# Patient Record
Sex: FEMALE | Race: BLACK OR AFRICAN AMERICAN | NOT HISPANIC OR LATINO | Employment: OTHER | RURAL
[De-identification: names, ages, dates, MRNs, and addresses within clinical notes are randomized per-mention and may not be internally consistent; named-entity substitution may affect disease eponyms.]

---

## 2021-11-17 ENCOUNTER — OFFICE VISIT (OUTPATIENT)
Dept: FAMILY MEDICINE | Facility: CLINIC | Age: 86
End: 2021-11-17
Payer: MEDICARE

## 2021-11-17 VITALS
TEMPERATURE: 99 F | DIASTOLIC BLOOD PRESSURE: 58 MMHG | BODY MASS INDEX: 30 KG/M2 | WEIGHT: 163 LBS | OXYGEN SATURATION: 96 % | SYSTOLIC BLOOD PRESSURE: 90 MMHG | HEART RATE: 87 BPM | HEIGHT: 62 IN | RESPIRATION RATE: 18 BRPM

## 2021-11-17 DIAGNOSIS — N39.0 ACUTE LOWER URINARY TRACT INFECTION: Primary | ICD-10-CM

## 2021-11-17 DIAGNOSIS — R10.9 ABDOMINAL PAIN, UNSPECIFIED ABDOMINAL LOCATION: ICD-10-CM

## 2021-11-17 LAB
BACTERIA #/AREA URNS HPF: ABNORMAL /HPF
HYALINE CASTS #/AREA URNS LPF: ABNORMAL /LPF
MUCOUS THREADS #/AREA URNS HPF: ABNORMAL /HPF
RBC #/AREA URNS HPF: ABNORMAL /HPF
SQUAMOUS #/AREA URNS LPF: ABNORMAL /LPF
TRI-PHOS CRY #/AREA URNS LPF: ABNORMAL /LPF
WBC #/AREA URNS HPF: ABNORMAL /HPF

## 2021-11-17 PROCEDURE — 87186 SC STD MICRODIL/AGAR DIL: CPT | Mod: ,,, | Performed by: CLINICAL MEDICAL LABORATORY

## 2021-11-17 PROCEDURE — 81001 URINALYSIS, MICROSCOPIC: ICD-10-PCS | Mod: ,,, | Performed by: CLINICAL MEDICAL LABORATORY

## 2021-11-17 PROCEDURE — 1125F AMNT PAIN NOTED PAIN PRSNT: CPT | Mod: CPTII,,, | Performed by: FAMILY MEDICINE

## 2021-11-17 PROCEDURE — 1101F PT FALLS ASSESS-DOCD LE1/YR: CPT | Mod: CPTII,,, | Performed by: FAMILY MEDICINE

## 2021-11-17 PROCEDURE — 87077 CULTURE, URINE: ICD-10-PCS | Mod: ,,, | Performed by: CLINICAL MEDICAL LABORATORY

## 2021-11-17 PROCEDURE — 87077 CULTURE AEROBIC IDENTIFY: CPT | Mod: ,,, | Performed by: CLINICAL MEDICAL LABORATORY

## 2021-11-17 PROCEDURE — 87086 CULTURE, URINE: ICD-10-PCS | Mod: ,,, | Performed by: CLINICAL MEDICAL LABORATORY

## 2021-11-17 PROCEDURE — 99212 OFFICE O/P EST SF 10 MIN: CPT | Mod: ,,, | Performed by: FAMILY MEDICINE

## 2021-11-17 PROCEDURE — 87086 URINE CULTURE/COLONY COUNT: CPT | Mod: ,,, | Performed by: CLINICAL MEDICAL LABORATORY

## 2021-11-17 PROCEDURE — 3288F PR FALLS RISK ASSESSMENT DOCUMENTED: ICD-10-PCS | Mod: CPTII,,, | Performed by: FAMILY MEDICINE

## 2021-11-17 PROCEDURE — 87186 CULTURE, URINE: ICD-10-PCS | Mod: ,,, | Performed by: CLINICAL MEDICAL LABORATORY

## 2021-11-17 PROCEDURE — 99212 PR OFFICE/OUTPT VISIT, EST, LEVL II, 10-19 MIN: ICD-10-PCS | Mod: ,,, | Performed by: FAMILY MEDICINE

## 2021-11-17 PROCEDURE — 3288F FALL RISK ASSESSMENT DOCD: CPT | Mod: CPTII,,, | Performed by: FAMILY MEDICINE

## 2021-11-17 PROCEDURE — 1101F PR PT FALLS ASSESS DOC 0-1 FALLS W/OUT INJ PAST YR: ICD-10-PCS | Mod: CPTII,,, | Performed by: FAMILY MEDICINE

## 2021-11-17 PROCEDURE — 81001 URINALYSIS AUTO W/SCOPE: CPT | Mod: ,,, | Performed by: CLINICAL MEDICAL LABORATORY

## 2021-11-17 PROCEDURE — 1125F PR PAIN SEVERITY QUANTIFIED, PAIN PRESENT: ICD-10-PCS | Mod: CPTII,,, | Performed by: FAMILY MEDICINE

## 2021-11-17 RX ORDER — SULFAMETHOXAZOLE AND TRIMETHOPRIM 800; 160 MG/1; MG/1
1 TABLET ORAL 2 TIMES DAILY
Qty: 20 TABLET | Refills: 0 | Status: SHIPPED | OUTPATIENT
Start: 2021-11-17 | End: 2021-11-27

## 2021-11-19 LAB — UA COMPLETE W REFLEX CULTURE PNL UR: ABNORMAL

## 2021-12-27 RX ORDER — NITROFURANTOIN 25; 75 MG/1; MG/1
100 CAPSULE ORAL 2 TIMES DAILY
COMMUNITY
End: 2021-12-27 | Stop reason: SDUPTHER

## 2021-12-28 RX ORDER — NITROFURANTOIN 25; 75 MG/1; MG/1
100 CAPSULE ORAL 2 TIMES DAILY
Qty: 14 CAPSULE | Refills: 0 | Status: SHIPPED | OUTPATIENT
Start: 2021-12-28 | End: 2022-01-04

## 2022-04-06 ENCOUNTER — OFFICE VISIT (OUTPATIENT)
Dept: FAMILY MEDICINE | Facility: CLINIC | Age: 87
End: 2022-04-06
Payer: COMMERCIAL

## 2022-04-06 VITALS
RESPIRATION RATE: 18 BRPM | SYSTOLIC BLOOD PRESSURE: 120 MMHG | HEART RATE: 96 BPM | WEIGHT: 153 LBS | BODY MASS INDEX: 30.04 KG/M2 | DIASTOLIC BLOOD PRESSURE: 70 MMHG | HEIGHT: 60 IN | TEMPERATURE: 99 F | OXYGEN SATURATION: 98 %

## 2022-04-06 DIAGNOSIS — R35.0 URINATION FREQUENCY: ICD-10-CM

## 2022-04-06 DIAGNOSIS — N39.0 ACUTE URINARY TRACT INFECTION: Primary | ICD-10-CM

## 2022-04-06 LAB
BACTERIA #/AREA URNS HPF: ABNORMAL /HPF
BILIRUB SERPL-MCNC: NEGATIVE MG/DL
BLOOD URINE, POC: NORMAL
COLOR, POC UA: YELLOW
GLUCOSE UR QL STRIP: NEGATIVE
KETONES UR QL STRIP: NEGATIVE
LEUKOCYTE ESTERASE URINE, POC: NORMAL
MUCOUS THREADS #/AREA URNS HPF: ABNORMAL /HPF
NITRITE, POC UA: NEGATIVE
PH, POC UA: 7
PROTEIN, POC: 100
RBC #/AREA URNS HPF: ABNORMAL /HPF
SPECIFIC GRAVITY, POC UA: 1.02
TRI-PHOS CRY #/AREA URNS LPF: ABNORMAL /LPF
UROBILINOGEN, POC UA: 0.2
WBC #/AREA URNS HPF: ABNORMAL /HPF

## 2022-04-06 PROCEDURE — 1126F AMNT PAIN NOTED NONE PRSNT: CPT | Mod: ,,, | Performed by: FAMILY MEDICINE

## 2022-04-06 PROCEDURE — 1159F PR MEDICATION LIST DOCUMENTED IN MEDICAL RECORD: ICD-10-PCS | Mod: ,,, | Performed by: FAMILY MEDICINE

## 2022-04-06 PROCEDURE — 81003 URINALYSIS AUTO W/O SCOPE: CPT | Mod: RHCUB | Performed by: FAMILY MEDICINE

## 2022-04-06 PROCEDURE — 87077 CULTURE, URINE: ICD-10-PCS | Mod: ,,, | Performed by: CLINICAL MEDICAL LABORATORY

## 2022-04-06 PROCEDURE — 87186 SC STD MICRODIL/AGAR DIL: CPT | Mod: ,,, | Performed by: CLINICAL MEDICAL LABORATORY

## 2022-04-06 PROCEDURE — 99212 PR OFFICE/OUTPT VISIT, EST, LEVL II, 10-19 MIN: ICD-10-PCS | Mod: ,,, | Performed by: FAMILY MEDICINE

## 2022-04-06 PROCEDURE — 87086 CULTURE, URINE: ICD-10-PCS | Mod: ,,, | Performed by: CLINICAL MEDICAL LABORATORY

## 2022-04-06 PROCEDURE — 87077 CULTURE AEROBIC IDENTIFY: CPT | Mod: ,,, | Performed by: CLINICAL MEDICAL LABORATORY

## 2022-04-06 PROCEDURE — 1159F MED LIST DOCD IN RCRD: CPT | Mod: ,,, | Performed by: FAMILY MEDICINE

## 2022-04-06 PROCEDURE — 81003 URINALYSIS AUTO W/O SCOPE: CPT | Mod: QW,,, | Performed by: FAMILY MEDICINE

## 2022-04-06 PROCEDURE — 81001 URINALYSIS, MICROSCOPIC: ICD-10-PCS | Mod: ,,, | Performed by: CLINICAL MEDICAL LABORATORY

## 2022-04-06 PROCEDURE — 1126F PR PAIN SEVERITY QUANTIFIED, NO PAIN PRESENT: ICD-10-PCS | Mod: ,,, | Performed by: FAMILY MEDICINE

## 2022-04-06 PROCEDURE — 1160F RVW MEDS BY RX/DR IN RCRD: CPT | Mod: ,,, | Performed by: FAMILY MEDICINE

## 2022-04-06 PROCEDURE — 81003 PR URINALYSIS, AUTO, W/O SCOPE: ICD-10-PCS | Mod: QW,,, | Performed by: FAMILY MEDICINE

## 2022-04-06 PROCEDURE — 81001 URINALYSIS AUTO W/SCOPE: CPT | Mod: ,,, | Performed by: CLINICAL MEDICAL LABORATORY

## 2022-04-06 PROCEDURE — 87086 URINE CULTURE/COLONY COUNT: CPT | Mod: ,,, | Performed by: CLINICAL MEDICAL LABORATORY

## 2022-04-06 PROCEDURE — 87186 CULTURE, URINE: ICD-10-PCS | Mod: ,,, | Performed by: CLINICAL MEDICAL LABORATORY

## 2022-04-06 PROCEDURE — 1160F PR REVIEW ALL MEDS BY PRESCRIBER/CLIN PHARMACIST DOCUMENTED: ICD-10-PCS | Mod: ,,, | Performed by: FAMILY MEDICINE

## 2022-04-06 PROCEDURE — 99212 OFFICE O/P EST SF 10 MIN: CPT | Mod: ,,, | Performed by: FAMILY MEDICINE

## 2022-04-06 RX ORDER — FLUTICASONE PROPIONATE AND SALMETEROL 50; 250 UG/1; UG/1
1 POWDER RESPIRATORY (INHALATION) 2 TIMES DAILY
COMMUNITY
Start: 2022-02-07

## 2022-04-06 RX ORDER — FLUTICASONE PROPIONATE 50 MCG
SPRAY, SUSPENSION (ML) NASAL
COMMUNITY
Start: 2022-02-07

## 2022-04-06 RX ORDER — POTASSIUM CHLORIDE 750 MG/1
10 TABLET, EXTENDED RELEASE ORAL 2 TIMES DAILY
COMMUNITY
Start: 2022-03-24 | End: 2022-07-20 | Stop reason: SDUPTHER

## 2022-04-06 RX ORDER — CETIRIZINE HYDROCHLORIDE 10 MG/1
10 TABLET ORAL DAILY
COMMUNITY
End: 2022-07-20 | Stop reason: SDUPTHER

## 2022-04-06 RX ORDER — OXYBUTYNIN CHLORIDE 5 MG/1
5 TABLET ORAL 2 TIMES DAILY
COMMUNITY

## 2022-04-06 RX ORDER — DICLOFENAC SODIUM 10 MG/G
GEL TOPICAL
COMMUNITY
Start: 2022-02-07

## 2022-04-06 RX ORDER — METOPROLOL SUCCINATE 25 MG/1
25 TABLET, EXTENDED RELEASE ORAL DAILY
COMMUNITY
End: 2022-07-20 | Stop reason: SDUPTHER

## 2022-04-06 RX ORDER — POTASSIUM CHLORIDE 750 MG/1
10 TABLET, EXTENDED RELEASE ORAL 2 TIMES DAILY
COMMUNITY
End: 2022-04-06 | Stop reason: SDUPTHER

## 2022-04-06 RX ORDER — DOCUSATE SODIUM 100 MG/1
100 CAPSULE, LIQUID FILLED ORAL 2 TIMES DAILY PRN
COMMUNITY

## 2022-04-06 RX ORDER — LOVASTATIN 20 MG/1
20 TABLET ORAL NIGHTLY
COMMUNITY
End: 2022-07-20 | Stop reason: SDUPTHER

## 2022-04-06 RX ORDER — FUROSEMIDE 20 MG/1
20 TABLET ORAL 2 TIMES DAILY
COMMUNITY
Start: 2022-02-07

## 2022-04-06 RX ORDER — NITROFURANTOIN 25; 75 MG/1; MG/1
100 CAPSULE ORAL 2 TIMES DAILY
Qty: 10 CAPSULE | Refills: 0 | Status: SHIPPED | OUTPATIENT
Start: 2022-04-06 | End: 2022-04-11

## 2022-04-06 NOTE — PROGRESS NOTES
Clinic Note    Patient Name: Aviva Gonzalez  : 1935  MRN: [id number]    HPI:    Chief Complaint   Patient presents with    Urinary Frequency     Two weeks       Ms. Aviva Gonzalez is a 86 y.o. female who present to clinic today with CC of urinary frequency X 2 weeks.  Patient reports associated dysuria and occasional abdominal pain. Denies back pain, fever, or nausea/vomiting.  Otherwise, without complaints.    Medications:  Current Outpatient Medications on File Prior to Visit   Medication Sig Dispense Refill    cetirizine (ZYRTEC) 10 MG tablet Take 10 mg by mouth once daily.      docusate sodium (COLACE) 100 MG capsule Take 100 mg by mouth 2 (two) times daily as needed for Constipation.      lovastatin (MEVACOR) 20 MG tablet Take 20 mg by mouth every evening.      metoprolol succinate (TOPROL-XL) 25 MG 24 hr tablet Take 25 mg by mouth once daily.      oxybutynin (DITROPAN) 5 MG Tab Take 5 mg by mouth 2 (two) times a day.      [DISCONTINUED] potassium chloride (KLOR-CON) 10 MEQ TbSR Take 10 mEq by mouth 2 (two) times a day.      ADVAIR DISKUS 250-50 mcg/dose diskus inhaler Inhale 1 puff into the lungs 2 (two) times daily.      diclofenac sodium (VOLTAREN) 1 % Gel Apply topically.      fluticasone propionate (FLONASE) 50 mcg/actuation nasal spray by Each Nostril route.      furosemide (LASIX) 20 MG tablet Take 20 mg by mouth once daily.      potassium chloride SA (K-DUR,KLOR-CON) 10 MEQ tablet Take 10 mEq by mouth 2 (two) times daily.       No current facility-administered medications on file prior to visit.         Allergies: Penicillins      Past Medical History:    History reviewed. No pertinent past medical history.    Past Surgical History:    History reviewed. No pertinent surgical history.      Social History:    Social History     Tobacco Use   Smoking Status Never Smoker   Smokeless Tobacco Current User    Types: Chew     Social History     Substance and Sexual Activity   Alcohol Use Not  Currently     Social History     Substance and Sexual Activity   Drug Use Never         Family History:    History reviewed. No pertinent family history.    Review of Systems:    Review of Systems   Constitutional: Negative for appetite change, chills, fatigue, fever and unexpected weight change.   Eyes: Negative for visual disturbance.   Respiratory: Negative for cough and shortness of breath.    Cardiovascular: Negative for chest pain and leg swelling.   Gastrointestinal: Negative for abdominal pain, change in bowel habit, constipation, diarrhea, nausea, vomiting and change in bowel habit.   Genitourinary: Positive for frequency.   Musculoskeletal: Positive for arthralgias.   Integumentary:  Negative for rash.   Neurological: Negative for dizziness.        Reports occasional headaches   Psychiatric/Behavioral: The patient is not nervous/anxious.         Vitals:    /70 (BP Location: Right arm, Patient Position: Sitting, BP Method: Medium (Manual))   Pulse 96   Temp 98.5 °F (36.9 °C) (Oral)   Resp 18   Ht 5' (1.524 m)   Wt 69.4 kg (153 lb)   SpO2 98%   BMI 29.88 kg/m²        Physical Exam:    Physical Exam  Constitutional:       General: She is not in acute distress.     Appearance: Normal appearance.   HENT:      Nose: Nose normal.      Mouth/Throat:      Mouth: Mucous membranes are moist.      Pharynx: Oropharynx is clear.   Eyes:      Conjunctiva/sclera: Conjunctivae normal.   Cardiovascular:      Rate and Rhythm: Normal rate and regular rhythm.      Heart sounds: Normal heart sounds. No murmur heard.  Pulmonary:      Effort: Pulmonary effort is normal. No respiratory distress.      Breath sounds: Normal breath sounds. No wheezing, rhonchi or rales.   Abdominal:      General: Bowel sounds are normal.      Palpations: Abdomen is soft.      Tenderness: There is no abdominal tenderness. There is no right CVA tenderness, left CVA tenderness, guarding or rebound.   Musculoskeletal:      Cervical back:  Neck supple.   Skin:     Findings: No rash.   Neurological:      General: No focal deficit present.      Mental Status: She is alert. Mental status is at baseline.   Psychiatric:         Mood and Affect: Mood normal.         Assessment/Plan:   Acute urinary tract infection  -     nitrofurantoin, macrocrystal-monohydrate, (MACROBID) 100 MG capsule; Take 1 capsule (100 mg total) by mouth 2 (two) times daily. for 5 days  Dispense: 10 capsule; Refill: 0  -     Urinalysis, Microscopic; Future; Expected date: 04/06/2022  -     Urine culture; Future; Expected date: 04/06/2022    Urination frequency  -     POCT URINALYSIS W/O SCOPE  -     Urinalysis, Microscopic; Future; Expected date: 04/06/2022  -     Urine culture; Future; Expected date: 04/06/2022    RTC prn if symptoms worsen or fail to resolve.  RTC sooner if needed.   Patient voiced understanding and is agreeable to plan.      Gregoria Winter MD    Family Medicine

## 2022-04-08 LAB — UA COMPLETE W REFLEX CULTURE PNL UR: ABNORMAL

## 2022-04-21 NOTE — PROGRESS NOTES
ATTEMPT TO REACH SEVERAL TIMES, UNABLE TO REACH THIS PATIENT A LETTER WILL BE SENT TO ADDRESS IN [place]

## 2022-07-20 ENCOUNTER — OFFICE VISIT (OUTPATIENT)
Dept: FAMILY MEDICINE | Facility: CLINIC | Age: 87
End: 2022-07-20
Payer: COMMERCIAL

## 2022-07-20 VITALS
BODY MASS INDEX: 30.82 KG/M2 | SYSTOLIC BLOOD PRESSURE: 118 MMHG | OXYGEN SATURATION: 97 % | DIASTOLIC BLOOD PRESSURE: 76 MMHG | HEIGHT: 60 IN | RESPIRATION RATE: 20 BRPM | HEART RATE: 70 BPM | WEIGHT: 157 LBS | TEMPERATURE: 98 F

## 2022-07-20 DIAGNOSIS — R31.9 URINARY TRACT INFECTION WITH HEMATURIA, SITE UNSPECIFIED: Primary | ICD-10-CM

## 2022-07-20 DIAGNOSIS — N39.0 URINARY TRACT INFECTION WITH HEMATURIA, SITE UNSPECIFIED: Primary | ICD-10-CM

## 2022-07-20 DIAGNOSIS — J30.2 SEASONAL ALLERGIES: ICD-10-CM

## 2022-07-20 DIAGNOSIS — R35.0 URINARY FREQUENCY: ICD-10-CM

## 2022-07-20 DIAGNOSIS — E78.5 HYPERLIPIDEMIA, UNSPECIFIED HYPERLIPIDEMIA TYPE: ICD-10-CM

## 2022-07-20 DIAGNOSIS — E87.6 HYPOKALEMIA: ICD-10-CM

## 2022-07-20 DIAGNOSIS — R30.9 PAIN WITH URINATION: ICD-10-CM

## 2022-07-20 DIAGNOSIS — I10 HYPERTENSION, UNSPECIFIED TYPE: ICD-10-CM

## 2022-07-20 DIAGNOSIS — E55.9 VITAMIN D DEFICIENCY: ICD-10-CM

## 2022-07-20 LAB
BACTERIA #/AREA URNS HPF: ABNORMAL /HPF
BILIRUB UR QL STRIP: NEGATIVE
CLARITY UR: ABNORMAL
COLOR UR: YELLOW
GLUCOSE UR STRIP-MCNC: NORMAL MG/DL
KETONES UR STRIP-SCNC: NEGATIVE MG/DL
LEUKOCYTE ESTERASE UR QL STRIP: ABNORMAL
MUCOUS, UA: ABNORMAL /LPF
NITRITE UR QL STRIP: NEGATIVE
PH UR STRIP: 6.5 PH UNITS
PROT UR QL STRIP: 30
RBC # UR STRIP: ABNORMAL /UL
RBC #/AREA URNS HPF: 33 /HPF
SP GR UR STRIP: 1.01
SQUAMOUS #/AREA URNS LPF: ABNORMAL /HPF
UROBILINOGEN UR STRIP-ACNC: NORMAL MG/DL
WBC #/AREA URNS HPF: >182 /HPF
WBC CLUMPS, UA: ABNORMAL /HPF

## 2022-07-20 PROCEDURE — 1126F AMNT PAIN NOTED NONE PRSNT: CPT | Mod: ,,, | Performed by: NURSE PRACTITIONER

## 2022-07-20 PROCEDURE — 81001 URINALYSIS, REFLEX TO URINE CULTURE: ICD-10-PCS | Mod: ,,, | Performed by: CLINICAL MEDICAL LABORATORY

## 2022-07-20 PROCEDURE — 1160F RVW MEDS BY RX/DR IN RCRD: CPT | Mod: ,,, | Performed by: NURSE PRACTITIONER

## 2022-07-20 PROCEDURE — 81001 URINALYSIS AUTO W/SCOPE: CPT | Mod: ,,, | Performed by: CLINICAL MEDICAL LABORATORY

## 2022-07-20 PROCEDURE — 1160F PR REVIEW ALL MEDS BY PRESCRIBER/CLIN PHARMACIST DOCUMENTED: ICD-10-PCS | Mod: ,,, | Performed by: NURSE PRACTITIONER

## 2022-07-20 PROCEDURE — 81003 URINALYSIS AUTO W/O SCOPE: CPT | Mod: QW,,, | Performed by: NURSE PRACTITIONER

## 2022-07-20 PROCEDURE — 3288F FALL RISK ASSESSMENT DOCD: CPT | Mod: ,,, | Performed by: NURSE PRACTITIONER

## 2022-07-20 PROCEDURE — 87086 URINE CULTURE/COLONY COUNT: CPT | Mod: ,,, | Performed by: CLINICAL MEDICAL LABORATORY

## 2022-07-20 PROCEDURE — 1126F PR PAIN SEVERITY QUANTIFIED, NO PAIN PRESENT: ICD-10-PCS | Mod: ,,, | Performed by: NURSE PRACTITIONER

## 2022-07-20 PROCEDURE — 81003 POCT URINALYSIS W/O SCOPE: ICD-10-PCS | Mod: QW,,, | Performed by: NURSE PRACTITIONER

## 2022-07-20 PROCEDURE — 87086 CULTURE, URINE: ICD-10-PCS | Mod: ,,, | Performed by: CLINICAL MEDICAL LABORATORY

## 2022-07-20 PROCEDURE — 3288F PR FALLS RISK ASSESSMENT DOCUMENTED: ICD-10-PCS | Mod: ,,, | Performed by: NURSE PRACTITIONER

## 2022-07-20 PROCEDURE — 1159F PR MEDICATION LIST DOCUMENTED IN MEDICAL RECORD: ICD-10-PCS | Mod: ,,, | Performed by: NURSE PRACTITIONER

## 2022-07-20 PROCEDURE — 87186 SC STD MICRODIL/AGAR DIL: CPT | Mod: ,,, | Performed by: CLINICAL MEDICAL LABORATORY

## 2022-07-20 PROCEDURE — 87077 CULTURE AEROBIC IDENTIFY: CPT | Mod: ,,, | Performed by: CLINICAL MEDICAL LABORATORY

## 2022-07-20 PROCEDURE — 1159F MED LIST DOCD IN RCRD: CPT | Mod: ,,, | Performed by: NURSE PRACTITIONER

## 2022-07-20 PROCEDURE — 99213 PR OFFICE/OUTPT VISIT, EST, LEVL III, 20-29 MIN: ICD-10-PCS | Mod: ,,, | Performed by: NURSE PRACTITIONER

## 2022-07-20 PROCEDURE — 87077 CULTURE, URINE: ICD-10-PCS | Mod: ,,, | Performed by: CLINICAL MEDICAL LABORATORY

## 2022-07-20 PROCEDURE — 99213 OFFICE O/P EST LOW 20 MIN: CPT | Mod: ,,, | Performed by: NURSE PRACTITIONER

## 2022-07-20 PROCEDURE — 1101F PT FALLS ASSESS-DOCD LE1/YR: CPT | Mod: ,,, | Performed by: NURSE PRACTITIONER

## 2022-07-20 PROCEDURE — 1101F PR PT FALLS ASSESS DOC 0-1 FALLS W/OUT INJ PAST YR: ICD-10-PCS | Mod: ,,, | Performed by: NURSE PRACTITIONER

## 2022-07-20 PROCEDURE — 87186 CULTURE, URINE: ICD-10-PCS | Mod: ,,, | Performed by: CLINICAL MEDICAL LABORATORY

## 2022-07-20 RX ORDER — CYANOCOBALAMIN (VITAMIN B-12) 500 MCG
400 TABLET ORAL DAILY
Qty: 30 TABLET | Refills: 0 | Status: SHIPPED | OUTPATIENT
Start: 2022-07-20

## 2022-07-20 RX ORDER — SULFAMETHOXAZOLE AND TRIMETHOPRIM 800; 160 MG/1; MG/1
1 TABLET ORAL 2 TIMES DAILY
Qty: 20 TABLET | Refills: 0 | Status: SHIPPED | OUTPATIENT
Start: 2022-07-20 | End: 2023-04-11

## 2022-07-20 RX ORDER — LOVASTATIN 20 MG/1
20 TABLET ORAL NIGHTLY
Qty: 30 TABLET | Refills: 0 | Status: SHIPPED | OUTPATIENT
Start: 2022-07-20

## 2022-07-20 RX ORDER — METOPROLOL SUCCINATE 25 MG/1
25 TABLET, EXTENDED RELEASE ORAL DAILY
Qty: 30 TABLET | Refills: 0 | Status: SHIPPED | OUTPATIENT
Start: 2022-07-20

## 2022-07-20 RX ORDER — CYANOCOBALAMIN (VITAMIN B-12) 500 MCG
TABLET ORAL DAILY
COMMUNITY
End: 2022-07-20 | Stop reason: SDUPTHER

## 2022-07-20 RX ORDER — CHOLECALCIFEROL (VITAMIN D3) 25 MCG
1000 TABLET ORAL DAILY
COMMUNITY
End: 2024-02-05 | Stop reason: SDUPTHER

## 2022-07-20 RX ORDER — CETIRIZINE HYDROCHLORIDE 10 MG/1
10 TABLET ORAL DAILY
Qty: 30 TABLET | Refills: 0 | Status: SHIPPED | OUTPATIENT
Start: 2022-07-20

## 2022-07-20 RX ORDER — POTASSIUM CHLORIDE 750 MG/1
10 TABLET, EXTENDED RELEASE ORAL 2 TIMES DAILY
Qty: 60 TABLET | Refills: 0 | Status: SHIPPED | OUTPATIENT
Start: 2022-07-20

## 2022-07-20 NOTE — PROGRESS NOTES
New clinic note    Aviva Gonzalez is a 86 y.o. female     Chief Complaint:   Chief Complaint   Patient presents with    Urinary Frequency    Dysuria    Medication Refill        Subjective:    Patient complains of dysuria and urinary frequency. Patient reports symptoms X 1 week. Denies hematuria. Denies fever. Denies hx of kidney stones.   Patient states she sees her pcp at [facility] next week. Patient states she is going to run completely out of a few of medications prior to visit. Patient requesting temporary refills if possible. Denies any adverse side effects.        Allergies:   Review of patient's allergies indicates:   Allergen Reactions    Penicillins Rash        Past Medical History:  History reviewed. No pertinent past medical history.     Current Medications:    Current Outpatient Medications:     ADVAIR DISKUS 250-50 mcg/dose diskus inhaler, Inhale 1 puff into the lungs 2 (two) times daily., Disp: , Rfl:     diclofenac sodium (VOLTAREN) 1 % Gel, Apply topically., Disp: , Rfl:     docusate sodium (COLACE) 100 MG capsule, Take 100 mg by mouth 2 (two) times daily as needed for Constipation., Disp: , Rfl:     fluticasone propionate (FLONASE) 50 mcg/actuation nasal spray, by Each Nostril route., Disp: , Rfl:     furosemide (LASIX) 20 MG tablet, Take 20 mg by mouth once daily., Disp: , Rfl:     oxybutynin (DITROPAN) 5 MG Tab, Take 5 mg by mouth 2 (two) times a day., Disp: , Rfl:     vitamin D (VITAMIN D3) 1000 units Tab, Take 1,000 Units by mouth once daily., Disp: , Rfl:     cetirizine (ZYRTEC) 10 MG tablet, Take 1 tablet (10 mg total) by mouth once daily., Disp: 30 tablet, Rfl: 0    cholecalciferol, vitamin D3, (VITAMIN D3) 10 mcg (400 unit) Tab, Take 1 tablet (400 Units total) by mouth once daily., Disp: 30 tablet, Rfl: 0    lovastatin (MEVACOR) 20 MG tablet, Take 1 tablet (20 mg total) by mouth every evening., Disp: 30 tablet, Rfl: 0    metoprolol succinate (TOPROL-XL) 25 MG 24 hr tablet,  Take 1 tablet (25 mg total) by mouth once daily., Disp: 30 tablet, Rfl: 0    potassium chloride SA (K-DUR,KLOR-CON) 10 MEQ tablet, Take 1 tablet (10 mEq total) by mouth 2 (two) times daily., Disp: 60 tablet, Rfl: 0    sulfamethoxazole-trimethoprim 800-160mg (BACTRIM DS) 800-160 mg Tab, Take 1 tablet by mouth 2 (two) times daily., Disp: 20 tablet, Rfl: 0       Review of Systems   Constitutional: Negative for fever.   Respiratory: Negative for cough.    Genitourinary: Positive for dysuria and frequency. Negative for hematuria.          Objective:    /76 (BP Location: Left arm, Patient Position: Sitting, BP Method: Large (Manual))   Pulse 70   Temp 97.6 °F (36.4 °C) (Temporal)   Resp 20   Ht 5' (1.524 m)   Wt 71.2 kg (157 lb)   SpO2 97%   BMI 30.66 kg/m²      Physical Exam  Constitutional:       Appearance: Normal appearance.   Eyes:      Extraocular Movements: Extraocular movements intact.   Cardiovascular:      Rate and Rhythm: Normal rate and regular rhythm.      Pulses: Normal pulses.      Heart sounds: Normal heart sounds.   Pulmonary:      Effort: Pulmonary effort is normal.      Breath sounds: Normal breath sounds.   Abdominal:      Palpations: Abdomen is soft.      Tenderness: There is no abdominal tenderness. There is no right CVA tenderness, left CVA tenderness or guarding.   Musculoskeletal:      Comments: Uses rolling walker   Neurological:      Mental Status: She is alert and oriented to person, place, and time.          Assessment and Plan:    1. Urinary tract infection with hematuria, site unspecified    2. Urinary frequency    3. Pain with urination    4. Hyperlipidemia, unspecified hyperlipidemia type    5. Vitamin D deficiency    6. Hypokalemia    7. Hypertension, unspecified type    8. Seasonal allergies         Urinary tract infection with hematuria, site unspecified  -     sulfamethoxazole-trimethoprim 800-160mg (BACTRIM DS) 800-160 mg Tab; Take 1 tablet by mouth 2 (two) times daily.   Dispense: 20 tablet; Refill: 0  -     Urinalysis, Reflex to Urine Culture; Future; Expected date: 07/20/2022    Urinary frequency  -     POCT URINALYSIS W/O SCOPE    Pain with urination  -     POCT URINALYSIS W/O SCOPE    Hyperlipidemia, unspecified hyperlipidemia type  -     lovastatin (MEVACOR) 20 MG tablet; Take 1 tablet (20 mg total) by mouth every evening.  Dispense: 30 tablet; Refill: 0    Vitamin D deficiency  -     cholecalciferol, vitamin D3, (VITAMIN D3) 10 mcg (400 unit) Tab; Take 1 tablet (400 Units total) by mouth once daily.  Dispense: 30 tablet; Refill: 0    Hypokalemia  -     potassium chloride SA (K-DUR,KLOR-CON) 10 MEQ tablet; Take 1 tablet (10 mEq total) by mouth 2 (two) times daily.  Dispense: 60 tablet; Refill: 0    Hypertension, unspecified type  -     metoprolol succinate (TOPROL-XL) 25 MG 24 hr tablet; Take 1 tablet (25 mg total) by mouth once daily.  Dispense: 30 tablet; Refill: 0    Seasonal allergies  -     cetirizine (ZYRTEC) 10 MG tablet; Take 1 tablet (10 mg total) by mouth once daily.  Dispense: 30 tablet; Refill: 0       --culture urine.  -rx bactrim ds bid  -refilled routine meds patient requesting temporary supply until f/u with pcp.     There are no Patient Instructions on file for this visit.   Follow up if symptoms worsen or fail to improve.

## 2022-07-21 LAB
BILIRUB SERPL-MCNC: ABNORMAL MG/DL
BLOOD URINE, POC: ABNORMAL
COLOR, POC UA: YELLOW
GLUCOSE UR QL STRIP: ABNORMAL
KETONES UR QL STRIP: ABNORMAL
LEUKOCYTE ESTERASE URINE, POC: ABNORMAL
NITRITE, POC UA: ABNORMAL
PH, POC UA: 6
PROTEIN, POC: 100
SPECIFIC GRAVITY, POC UA: 1.02
UROBILINOGEN, POC UA: 0.2

## 2022-07-22 LAB — UA COMPLETE W REFLEX CULTURE PNL UR: ABNORMAL

## 2022-11-09 DIAGNOSIS — Z71.89 COMPLEX CARE COORDINATION: ICD-10-CM

## 2023-04-08 ENCOUNTER — HOSPITAL ENCOUNTER (EMERGENCY)
Facility: HOSPITAL | Age: 88
Discharge: HOME | End: 2023-04-08
Payer: MEDICAID

## 2023-04-08 VITALS
DIASTOLIC BLOOD PRESSURE: 67 MMHG | WEIGHT: 160 LBS | HEIGHT: 64 IN | BODY MASS INDEX: 27.31 KG/M2 | TEMPERATURE: 99 F | OXYGEN SATURATION: 98 % | SYSTOLIC BLOOD PRESSURE: 127 MMHG | RESPIRATION RATE: 18 BRPM | HEART RATE: 83 BPM

## 2023-04-08 DIAGNOSIS — J06.9 RECENT UPPER RESPIRATORY TRACT INFECTION: ICD-10-CM

## 2023-04-08 DIAGNOSIS — K22.2 ESOPHAGEAL STRICTURE: Primary | ICD-10-CM

## 2023-04-08 DIAGNOSIS — M19.90 ARTHRITIS: ICD-10-CM

## 2023-04-08 PROCEDURE — 96372 THER/PROPH/DIAG INJ SC/IM: CPT | Performed by: FAMILY MEDICINE

## 2023-04-08 PROCEDURE — 25000003 PHARM REV CODE 250: Performed by: FAMILY MEDICINE

## 2023-04-08 PROCEDURE — 99284 EMERGENCY DEPT VISIT MOD MDM: CPT

## 2023-04-08 PROCEDURE — 99283 PR EMERGENCY DEPT VISIT,LEVEL III: ICD-10-PCS | Mod: EDII,,, | Performed by: FAMILY MEDICINE

## 2023-04-08 PROCEDURE — 99283 EMERGENCY DEPT VISIT LOW MDM: CPT | Mod: EDII,,, | Performed by: FAMILY MEDICINE

## 2023-04-08 PROCEDURE — 99283 EMERGENCY DEPT VISIT LOW MDM: CPT

## 2023-04-08 PROCEDURE — 63600175 PHARM REV CODE 636 W HCPCS: Performed by: FAMILY MEDICINE

## 2023-04-08 RX ORDER — DEXAMETHASONE SODIUM PHOSPHATE 4 MG/ML
4 INJECTION, SOLUTION INTRA-ARTICULAR; INTRALESIONAL; INTRAMUSCULAR; INTRAVENOUS; SOFT TISSUE
Status: COMPLETED | OUTPATIENT
Start: 2023-04-08 | End: 2023-04-08

## 2023-04-08 RX ORDER — METHYLPREDNISOLONE ACETATE 40 MG/ML
40 INJECTION, SUSPENSION INTRA-ARTICULAR; INTRALESIONAL; INTRAMUSCULAR; SOFT TISSUE
Status: COMPLETED | OUTPATIENT
Start: 2023-04-08 | End: 2023-04-08

## 2023-04-08 RX ADMIN — LIDOCAINE HYDROCHLORIDE 1 G: 10 INJECTION, SOLUTION INFILTRATION; PERINEURAL at 07:04

## 2023-04-08 RX ADMIN — DEXAMETHASONE SODIUM PHOSPHATE 4 MG: 4 INJECTION, SOLUTION INTRA-ARTICULAR; INTRALESIONAL; INTRAMUSCULAR; INTRAVENOUS; SOFT TISSUE at 07:04

## 2023-04-08 RX ADMIN — METHYLPREDNISOLONE ACETATE 40 MG: 40 INJECTION, SUSPENSION INTRA-ARTICULAR; INTRALESIONAL; INTRAMUSCULAR; SOFT TISSUE at 07:04

## 2023-04-08 NOTE — ED PROVIDER NOTES
Encounter Date: 4/8/2023       History     Chief Complaint   Patient presents with    Sore Throat     Patient comes in with progressive weakness lower extremity.  Difficulty walking.  He has a long-term history of arthritis.  There is no other complications except for nasal congestion upper respiratory tract symptoms.  Explained the patient she may be able to have home health care through [facility] to do physical therapy.  She also has problems swallowing food she is a history of esophageal stricture.  And having balloon dilatation in the past.  Through [facility]      Review of patient's allergies indicates:   Allergen Reactions    Penicillins Rash     Past Medical History:   Diagnosis Date    Constipation     Hypercholesteremia     Hypertension     Hypokalemia     Renal disorder     Rheumatoid arthritis, unspecified     Seasonal allergies     Urinary frequency     Vitamin D deficiency      History reviewed. No pertinent surgical history.  History reviewed. No pertinent family history.  Social History     Tobacco Use    Smoking status: Never    Smokeless tobacco: Current     Types: Chew   Substance Use Topics    Alcohol use: Not Currently    Drug use: Never     Review of Systems   Constitutional: Negative.  Negative for fever.   HENT: Negative.  Negative for sore throat.         Patient with difficulty swallowing able to tolerate liquids by the patient to do Ensure over-the-counter.  And will make follow-up arrangements for the patient have esophageal stricture balloon with an EGD and a GI special   Eyes: Negative.    Respiratory: Negative.  Negative for shortness of breath.    Cardiovascular: Negative.  Negative for chest pain.   Gastrointestinal: Negative.  Negative for nausea.   Endocrine: Negative.    Genitourinary: Negative.  Negative for dysuria.   Musculoskeletal: Negative.  Negative for back pain.   Skin: Negative.  Negative for rash.   Allergic/Immunologic: Negative.     Neurological: Negative.  Negative for weakness.   Hematological: Negative.  Does not bruise/bleed easily.   Psychiatric/Behavioral: Negative.     All other systems reviewed and are negative.    Physical Exam     Initial Vitals [04/08/23 1726]   BP Pulse Resp Temp SpO2   127/67 83 18 98.5 °F (36.9 °C) 98 %      MAP       --         Physical Exam    Constitutional: She appears well-developed and well-nourished.   HENT:   Head: Normocephalic and atraumatic.   Right Ear: External ear normal.   Left Ear: External ear normal.   Nose: Nose normal.   Mouth/Throat: Oropharynx is clear and moist.   Eyes: Conjunctivae and EOM are normal. Pupils are equal, round, and reactive to light.   Neck: Neck supple.   Normal range of motion.  Cardiovascular:  Normal rate, regular rhythm, normal heart sounds and intact distal pulses.           Pulmonary/Chest: Breath sounds normal.   Abdominal: Abdomen is soft. Bowel sounds are normal.   Genitourinary:    Vagina and uterus normal.     Musculoskeletal:         General: Normal range of motion.      Cervical back: Normal range of motion and neck supple.      Comments: Mild 2+ edema lower extremities bilaterally.  Patient has decreased range of motion secondary to arthritic change     Neurological: She is alert and oriented to person, place, and time. She has normal strength and normal reflexes.   Skin: Skin is warm. Capillary refill takes less than 2 seconds.   Psychiatric: She has a normal mood and affect. Her behavior is normal. Judgment and thought content normal.       Medical Screening Exam   See Full Note    ED Course   Procedures  Labs Reviewed - No data to display       Imaging Results    None          Medications   cefTRIAXone (ROCEPHIN) 1 g in LIDOcaine HCL 10 mg/ml (1%) 4 mL IM only syringe (1 g Intramuscular Given 4/8/23 1916)   dexAMETHasone injection 4 mg (4 mg Intramuscular Given 4/8/23 1914)   methylPREDNISolone acetate injection 40 mg (40 mg Intramuscular Given 4/8/23  1914)     Medical Decision Making:   Initial Assessment:   As mentioned before the patient comes in with esophageal strictures.  Difficulty swallowing solid foods.  Able to tolerate liquids and blended foods.  Patient also with nasal congestion runny nose.  Sore throat.  She also has problems with ambulation.    -=-=-=  Differential Diagnosis:   1. Nasal congestion 2. Esophageal stricture by history.  3.  Generalized weakness secondary to arthritis.  ED Management:  Will set up home health care for the patient to have physical therapy at home.  And also making arrangement for the patient have an EGD with a gastroenterologist.  Patient continue current medications at home                 Clinical Impression:   Final diagnoses:  [K22.2] Esophageal stricture (Primary)  [J06.9] Recent upper respiratory tract infection  [M19.90] Arthritis        ED Disposition Condition    Discharge Stable          ED Prescriptions    None       Follow-up Information    None          Varun Tejada DO  04/08/23 1931

## 2023-04-09 NOTE — ED NOTES
Instructed patient to take all home medications as directed.  Pickup medications at the pharmacy & take as directed.  Drink plenty of water & fluids.  Follow-up with your PCP in 2-3 days.  Return to ed for any new or worsening symptoms.  Patient & daughter verbalized understanding of all instructions.

## 2023-04-11 ENCOUNTER — OFFICE VISIT (OUTPATIENT)
Dept: GASTROENTEROLOGY | Facility: CLINIC | Age: 88
End: 2023-04-11
Payer: MEDICARE

## 2023-04-11 VITALS
SYSTOLIC BLOOD PRESSURE: 113 MMHG | HEIGHT: 64 IN | HEART RATE: 71 BPM | BODY MASS INDEX: 27.31 KG/M2 | DIASTOLIC BLOOD PRESSURE: 63 MMHG | OXYGEN SATURATION: 100 % | WEIGHT: 160 LBS

## 2023-04-11 DIAGNOSIS — K22.2 ESOPHAGEAL STRICTURE: ICD-10-CM

## 2023-04-11 DIAGNOSIS — R13.10 DYSPHAGIA, UNSPECIFIED TYPE: Primary | ICD-10-CM

## 2023-04-11 PROCEDURE — 3288F PR FALLS RISK ASSESSMENT DOCUMENTED: ICD-10-PCS | Mod: CPTII,,,

## 2023-04-11 PROCEDURE — 1159F PR MEDICATION LIST DOCUMENTED IN MEDICAL RECORD: ICD-10-PCS | Mod: CPTII,,,

## 2023-04-11 PROCEDURE — 3288F FALL RISK ASSESSMENT DOCD: CPT | Mod: CPTII,,,

## 2023-04-11 PROCEDURE — 1159F MED LIST DOCD IN RCRD: CPT | Mod: CPTII,,,

## 2023-04-11 PROCEDURE — 99214 PR OFFICE/OUTPT VISIT, EST, LEVL IV, 30-39 MIN: ICD-10-PCS | Mod: S$PBB,,,

## 2023-04-11 PROCEDURE — 1101F PR PT FALLS ASSESS DOC 0-1 FALLS W/OUT INJ PAST YR: ICD-10-PCS | Mod: CPTII,,,

## 2023-04-11 PROCEDURE — 99214 OFFICE O/P EST MOD 30 MIN: CPT | Mod: S$PBB,,,

## 2023-04-11 PROCEDURE — 1160F PR REVIEW ALL MEDS BY PRESCRIBER/CLIN PHARMACIST DOCUMENTED: ICD-10-PCS | Mod: CPTII,,,

## 2023-04-11 PROCEDURE — 99214 OFFICE O/P EST MOD 30 MIN: CPT | Mod: PBBFAC

## 2023-04-11 PROCEDURE — 1160F RVW MEDS BY RX/DR IN RCRD: CPT | Mod: CPTII,,,

## 2023-04-11 PROCEDURE — 1101F PT FALLS ASSESS-DOCD LE1/YR: CPT | Mod: CPTII,,,

## 2023-04-11 NOTE — PROGRESS NOTES
Aviva Gonzalez is a 87 y.o. female here for Dysphagia        PCP: Chloé Winter  Referring Provider: [place], [place]  [address]  [facility] [place] [id number]     HPI:  Patient is an 88 yo female who presents to clinic today for complaint of dysphagia over the past 2 weeks. She describes a globus sensation. Difficulty swallowing food/liquids. She reports symptoms subsided on their own yesterday. States she has been able to drink and eat without complication last night and this morning. She denies ever having EGD/dilation in the past. She wishes today to proceed with procedure for further evaluation and dilation. Daughter is present and in agreement on this plan as well. Patient denies any nausea, vomiting, hematochezia, or melena. Reports her bowels are moving regularly w/o constipation or diarrhea. She is not on any blood thinners.         ROS:  Review of Systems   Constitutional:  Negative for unexpected weight change.   HENT:  Positive for trouble swallowing. Negative for sore throat.    Gastrointestinal:  Negative for abdominal pain, blood in stool, constipation, diarrhea, nausea, vomiting and reflux.   Musculoskeletal:  Positive for gait problem.        PMHX:  has a past medical history of Constipation, Hypercholesteremia, Hypertension, Hypokalemia, Renal disorder, Rheumatoid arthritis, unspecified, Seasonal allergies, Urinary frequency, and Vitamin D deficiency.    PSHX:  has no past surgical history on file.    PFHX: family history includes Breast cancer in her daughter; Diabetes in her mother; Hypertension in her mother.    PSlHX:  reports that she has never smoked. Her smokeless tobacco use includes chew. She reports that she does not drink alcohol and does not use drugs.        Review of patient's allergies indicates:   Allergen Reactions    Penicillins Rash       Medication List with Changes/Refills   Current Medications    ADVAIR DISKUS 250-50 MCG/DOSE  "DISKUS INHALER    Inhale 1 puff into the lungs 2 (two) times daily.    CETIRIZINE (ZYRTEC) 10 MG TABLET    Take 1 tablet (10 mg total) by mouth once daily.    CHOLECALCIFEROL, VITAMIN D3, (VITAMIN D3) 10 MCG (400 UNIT) TAB    Take 1 tablet (400 Units total) by mouth once daily.    DICLOFENAC SODIUM (VOLTAREN) 1 % GEL    Apply topically.    DOCUSATE SODIUM (COLACE) 100 MG CAPSULE    Take 100 mg by mouth 2 (two) times daily as needed for Constipation.    FLUTICASONE PROPIONATE (FLONASE) 50 MCG/ACTUATION NASAL SPRAY    by Each Nostril route.    FUROSEMIDE (LASIX) 20 MG TABLET    Take 20 mg by mouth once daily.    LOVASTATIN (MEVACOR) 20 MG TABLET    Take 1 tablet (20 mg total) by mouth every evening.    METOPROLOL SUCCINATE (TOPROL-XL) 25 MG 24 HR TABLET    Take 1 tablet (25 mg total) by mouth once daily.    OXYBUTYNIN (DITROPAN) 5 MG TAB    Take 5 mg by mouth 2 (two) times a day.    POTASSIUM CHLORIDE SA (K-DUR,KLOR-CON) 10 MEQ TABLET    Take 1 tablet (10 mEq total) by mouth 2 (two) times daily.    VITAMIN D (VITAMIN D3) 1000 UNITS TAB    Take 1,000 Units by mouth once daily.   Discontinued Medications    SULFAMETHOXAZOLE-TRIMETHOPRIM 800-160MG (BACTRIM DS) 800-160 MG TAB    Take 1 tablet by mouth 2 (two) times daily.        Objective Findings:  Vital Signs:  /63   Pulse 71   Ht 5' 4" (1.626 m)   Wt 72.6 kg (160 lb) Comment: est wt per pt statment  SpO2 100%   BMI 27.46 kg/m²  Body mass index is 27.46 kg/m².    Physical Exam:  Physical Exam  Vitals reviewed.   Constitutional:       General: She is not in acute distress.  HENT:      Mouth/Throat:      Mouth: Mucous membranes are moist.      Pharynx: Oropharynx is clear.   Cardiovascular:      Rate and Rhythm: Normal rate and regular rhythm.      Pulses: Normal pulses.   Pulmonary:      Effort: Pulmonary effort is normal.      Breath sounds: Normal breath sounds.   Abdominal:      General: Bowel sounds are normal.      Palpations: Abdomen is soft.      " Tenderness: There is no abdominal tenderness.   Skin:     General: Skin is warm and dry.   Neurological:      Mental Status: She is alert and oriented to person, place, and time. Mental status is at baseline.   Psychiatric:         Mood and Affect: Mood normal.        Labs:  No results found for: WBC, HGB, HCT, MCV, RDW, PLT, GRAN, LYMPH, MONO, EOS, BASO  No results found for: NA, K, CL, CO2, GLU, BUN, CREATININE, CALCIUM, PROT, ALBUMIN, BILITOT, ALKPHOS, AST, ALT      Imaging: No results found.      Assessment:  Aviva Gonzalez is a 87 y.o. female here with:  1. Dysphagia, unspecified type    2. Esophageal stricture          Recommendations:  1. CBC, CMP  2. Schedule EGD for dysphagia, globus sensation    Follow up in about 3 months (around 7/11/2023).      Order summary:  Orders Placed This Encounter    CBC Auto Differential    Comprehensive Metabolic Panel    EGD w Dilation       Thank you for allowing me to participate in the care of Aviva Gonzalez.      Carlota Rich, FNP-BC, AG-ACNP-BC

## 2023-04-12 NOTE — ADDENDUM NOTE
Encounter addended by: Mahsa Godwin on: 4/12/2023 12:05 PM   Actions taken: SmartForm saved, Flowsheet accepted

## 2023-04-13 NOTE — ADDENDUM NOTE
Encounter addended by: Kathrine Esparza RN on: 4/13/2023 1:10 PM   Actions taken: Contacts section saved

## 2023-04-14 NOTE — ADDENDUM NOTE
Encounter addended by: Kathrine Esparza RN on: 4/14/2023 9:20 AM   Actions taken: Flowsheet accepted, Contacts section saved

## 2023-05-23 ENCOUNTER — ANESTHESIA (OUTPATIENT)
Dept: GASTROENTEROLOGY | Facility: HOSPITAL | Age: 88
End: 2023-05-23
Payer: MEDICARE

## 2023-05-23 ENCOUNTER — ANESTHESIA EVENT (OUTPATIENT)
Dept: GASTROENTEROLOGY | Facility: HOSPITAL | Age: 88
End: 2023-05-23
Payer: MEDICARE

## 2023-05-23 ENCOUNTER — HOSPITAL ENCOUNTER (OUTPATIENT)
Dept: GASTROENTEROLOGY | Facility: HOSPITAL | Age: 88
Discharge: HOME | End: 2023-05-23
Payer: MEDICARE

## 2023-05-23 VITALS
RESPIRATION RATE: 17 BRPM | TEMPERATURE: 98 F | SYSTOLIC BLOOD PRESSURE: 148 MMHG | OXYGEN SATURATION: 100 % | HEART RATE: 55 BPM | DIASTOLIC BLOOD PRESSURE: 69 MMHG

## 2023-05-23 DIAGNOSIS — R13.10 DYSPHAGIA, UNSPECIFIED TYPE: ICD-10-CM

## 2023-05-23 DIAGNOSIS — R13.19 ESOPHAGEAL DYSPHAGIA: ICD-10-CM

## 2023-05-23 PROCEDURE — 88305 TISSUE EXAM BY PATHOLOGIST: CPT | Mod: TC,SUR | Performed by: INTERNAL MEDICINE

## 2023-05-23 PROCEDURE — 88342 IMHCHEM/IMCYTCHM 1ST ANTB: CPT | Mod: 26,,, | Performed by: PATHOLOGY

## 2023-05-23 PROCEDURE — D9220A PRA ANESTHESIA: ICD-10-PCS | Mod: ,,, | Performed by: NURSE ANESTHETIST, CERTIFIED REGISTERED

## 2023-05-23 PROCEDURE — 88305 SURGICAL PATHOLOGY: ICD-10-PCS | Mod: 26,,, | Performed by: PATHOLOGY

## 2023-05-23 PROCEDURE — 43248 EGD GUIDE WIRE INSERTION: CPT | Mod: ,,, | Performed by: INTERNAL MEDICINE

## 2023-05-23 PROCEDURE — 43239 EGD BIOPSY SINGLE/MULTIPLE: CPT | Mod: 59,,, | Performed by: INTERNAL MEDICINE

## 2023-05-23 PROCEDURE — 37000008 HC ANESTHESIA 1ST 15 MINUTES

## 2023-05-23 PROCEDURE — 37000009 HC ANESTHESIA EA ADD 15 MINS

## 2023-05-23 PROCEDURE — 63600175 PHARM REV CODE 636 W HCPCS: Performed by: NURSE ANESTHETIST, CERTIFIED REGISTERED

## 2023-05-23 PROCEDURE — 25000003 PHARM REV CODE 250: Performed by: NURSE ANESTHETIST, CERTIFIED REGISTERED

## 2023-05-23 PROCEDURE — 43248 PR EGD, FLEX, W/DILATION OVER GUIDEWIRE: ICD-10-PCS | Mod: ,,, | Performed by: INTERNAL MEDICINE

## 2023-05-23 PROCEDURE — 43239 EGD BIOPSY SINGLE/MULTIPLE: CPT | Mod: 59 | Performed by: INTERNAL MEDICINE

## 2023-05-23 PROCEDURE — 43248 EGD GUIDE WIRE INSERTION: CPT | Performed by: INTERNAL MEDICINE

## 2023-05-23 PROCEDURE — 88342 SURGICAL PATHOLOGY: ICD-10-PCS | Mod: 26,,, | Performed by: PATHOLOGY

## 2023-05-23 PROCEDURE — 88305 TISSUE EXAM BY PATHOLOGIST: CPT | Mod: 26,,, | Performed by: PATHOLOGY

## 2023-05-23 PROCEDURE — 43239 PR EGD, FLEX, W/BIOPSY, SGL/MULTI: ICD-10-PCS | Mod: 59,,, | Performed by: INTERNAL MEDICINE

## 2023-05-23 PROCEDURE — 27201423 OPTIME MED/SURG SUP & DEVICES STERILE SUPPLY

## 2023-05-23 PROCEDURE — D9220A PRA ANESTHESIA: Mod: ,,, | Performed by: NURSE ANESTHETIST, CERTIFIED REGISTERED

## 2023-05-23 RX ORDER — ETOMIDATE 2 MG/ML
INJECTION INTRAVENOUS
Status: DISCONTINUED | OUTPATIENT
Start: 2023-05-23 | End: 2023-05-23

## 2023-05-23 RX ORDER — SODIUM CHLORIDE 0.9 % (FLUSH) 0.9 %
10 SYRINGE (ML) INJECTION
Status: CANCELLED | OUTPATIENT
Start: 2023-05-23

## 2023-05-23 RX ORDER — PROPOFOL 10 MG/ML
VIAL (ML) INTRAVENOUS
Status: DISCONTINUED | OUTPATIENT
Start: 2023-05-23 | End: 2023-05-23

## 2023-05-23 RX ORDER — LIDOCAINE HYDROCHLORIDE 20 MG/ML
INJECTION, SOLUTION EPIDURAL; INFILTRATION; INTRACAUDAL; PERINEURAL
Status: DISCONTINUED | OUTPATIENT
Start: 2023-05-23 | End: 2023-05-23

## 2023-05-23 RX ADMIN — SODIUM CHLORIDE: 9 INJECTION, SOLUTION INTRAVENOUS at 08:05

## 2023-05-23 RX ADMIN — ETOMIDATE 10 MG: 20 INJECTION, SOLUTION INTRAVENOUS at 08:05

## 2023-05-23 RX ADMIN — PROPOFOL 25 MG: 10 INJECTION, EMULSION INTRAVENOUS at 08:05

## 2023-05-23 RX ADMIN — LIDOCAINE HYDROCHLORIDE 80 MG: 20 INJECTION, SOLUTION INTRAVENOUS at 08:05

## 2023-05-23 RX ADMIN — ETOMIDATE 8 MG: 20 INJECTION, SOLUTION INTRAVENOUS at 08:05

## 2023-05-23 NOTE — ANESTHESIA POSTPROCEDURE EVALUATION
Anesthesia Post Evaluation    Patient: Aviva Gonzalez    Procedure(s) Performed: * No procedures listed *    Final Anesthesia Type: general      Patient location during evaluation: GI PACU ([facility])  Patient participation: Yes- Able to Participate  Level of consciousness: awake and alert  Post-procedure vital signs: reviewed and stable  Pain management: adequate  Airway patency: patent    PONV status at discharge: No PONV  Anesthetic complications: no      Cardiovascular status: blood pressure returned to baseline, hemodynamically stable and stable  Respiratory status: unassisted  Hydration status: euvolemic  Follow-up not needed.  Comments: Pt voices appreciation for care          Vitals Value Taken Time   /71 05/23/23 0851   Temp 36.4 °C (97.5 °F) 05/23/23 0827   Pulse 51 05/23/23 0851   Resp 20 05/23/23 0850   SpO2 99 % 05/23/23 0850   Vitals shown include unvalidated device data.      Event Time   Out of Recovery 09:05:01         Pain/Jatinder Score: Jatinder Score: 10 (5/23/2023  8:40 AM)

## 2023-05-23 NOTE — H&P
Gastroenterology Pre-procedure H&P    Chief Complaint: Esophageal dysphagia    History of Present Illness    Aviva Gonzalez is a 87 y.o. female that  has a past medical history of Cancer, Constipation, Hypercholesteremia, Hypertension, Hypokalemia, Renal disorder, Rheumatoid arthritis, unspecified, Seasonal allergies, Urinary frequency, and Vitamin D deficiency.     Patient seen in GI clinic with globus sensation and dysphagia here for EGD. Denies heartburn symptoms.       Past Medical History:   Diagnosis Date    Cancer     breast    Constipation     Hypercholesteremia     Hypertension     Hypokalemia     Renal disorder     Rheumatoid arthritis, unspecified     Seasonal allergies     Urinary frequency     Vitamin D deficiency        Past Surgical History:   Procedure Laterality Date    BREAST SURGERY Left        Family History   Problem Relation Age of Onset    Hypertension Mother     Diabetes Mother     Breast cancer Daughter        Social History     Socioeconomic History    Marital status: Single   Tobacco Use    Smoking status: Never    Smokeless tobacco: Current     Types: Chew   Substance and Sexual Activity    Alcohol use: Never    Drug use: Never    Sexual activity: Not Currently       Current Outpatient Medications   Medication Sig Dispense Refill    ADVAIR DISKUS 250-50 mcg/dose diskus inhaler Inhale 1 puff into the lungs 2 (two) times daily.      cetirizine (ZYRTEC) 10 MG tablet Take 1 tablet (10 mg total) by mouth once daily. 30 tablet 0    cholecalciferol, vitamin D3, (VITAMIN D3) 10 mcg (400 unit) Tab Take 1 tablet (400 Units total) by mouth once daily. 30 tablet 0    diclofenac sodium (VOLTAREN) 1 % Gel Apply topically.      docusate sodium (COLACE) 100 MG capsule Take 100 mg by mouth 2 (two) times daily as needed for Constipation.      fluticasone propionate (FLONASE) 50 mcg/actuation nasal spray by Each Nostril route.      furosemide (LASIX) 20 MG tablet Take 20 mg by mouth once daily.      lovastatin  (MEVACOR) 20 MG tablet Take 1 tablet (20 mg total) by mouth every evening. 30 tablet 0    metoprolol succinate (TOPROL-XL) 25 MG 24 hr tablet Take 1 tablet (25 mg total) by mouth once daily. 30 tablet 0    oxybutynin (DITROPAN) 5 MG Tab Take 5 mg by mouth 2 (two) times a day.      potassium chloride SA (K-DUR,KLOR-CON) 10 MEQ tablet Take 1 tablet (10 mEq total) by mouth 2 (two) times daily. 60 tablet 0    vitamin D (VITAMIN D3) 1000 units Tab Take 1,000 Units by mouth once daily.       No current facility-administered medications for this encounter.       Review of patient's allergies indicates:   Allergen Reactions    Penicillins Rash       Objective:  Vitals:    05/23/23 0718   BP: (!) 159/77   Pulse: 69   Resp: 16   SpO2: 98%        GEN: normal appearing, NAD, AAO x3  HENT: NCAT, anicteric, OP benign  CV: normal rate, regular rhythm  RESP: CTA, symmetric rise, unlabored  ABD: soft, ND, no guarding or TTP  SKIN: warm and dry  NEURO: grossly afocal    Assessment and Plan:    Proceed with:    EGD for dysphagia       Andre Dow MD  Gastroenterology

## 2023-05-23 NOTE — ANESTHESIA PREPROCEDURE EVALUATION
05/23/2023  Aviva Gonzalez is a 87 y.o., female.      Pre-op Assessment    I have reviewed the Patient Summary Reports.     I have reviewed the Nursing Notes. I have reviewed the NPO Status.   I have reviewed the Medications.     Review of Systems  Anesthesia Hx:  History of prior surgery of interest to airway management or planning: Denies Family Hx of Anesthesia complications.   Denies Personal Hx of Anesthesia complications.   Cardiovascular:   Hypertension    Pulmonary:   Asthma    Renal/:   Chronic Renal Disease        Physical Exam  General: Well nourished    Airway:  Mallampati: II   Mouth Opening: Normal  TM Distance: Normal, at least 6 cm  Tongue: Normal  Neck ROM: Normal ROM        Anesthesia Plan  Type of Anesthesia, risks & benefits discussed:    Anesthesia Type: Gen Natural Airway  Intra-op Monitoring Plan: Standard ASA Monitors  Post Op Pain Control Plan: multimodal analgesia  Induction:  IV  Informed Consent: Informed consent signed with the Patient and all parties understand the risks and agree with anesthesia plan.  All questions answered. Patient consented to blood products? No  ASA Score: 3  Day of Surgery Review of History & Physical: H&P Update referred to the surgeon/provider.I have interviewed and examined the patient. I have reviewed the patient's H&P dated: There are no significant changes.     Ready For Surgery From Anesthesia Perspective.     .   Active Ambulatory Problems     Diagnosis Date Noted    Seasonal allergies 07/20/2022    Vitamin D deficiency 07/20/2022    Hyperlipidemia 07/20/2022    Hypertension 07/20/2022    Hypokalemia 07/20/2022     Resolved Ambulatory Problems     Diagnosis Date Noted    No Resolved Ambulatory Problems     Past Medical History:   Diagnosis Date    Cancer     Constipation     Hypercholesteremia     Renal disorder     Rheumatoid arthritis,  unspecified     Urinary frequency      Review of patient's allergies indicates:   Allergen Reactions    Penicillins Rash     Current Outpatient Medications on File Prior to Encounter   Medication Sig Dispense Refill    ADVAIR DISKUS 250-50 mcg/dose diskus inhaler Inhale 1 puff into the lungs 2 (two) times daily.      cetirizine (ZYRTEC) 10 MG tablet Take 1 tablet (10 mg total) by mouth once daily. 30 tablet 0    cholecalciferol, vitamin D3, (VITAMIN D3) 10 mcg (400 unit) Tab Take 1 tablet (400 Units total) by mouth once daily. 30 tablet 0    diclofenac sodium (VOLTAREN) 1 % Gel Apply topically.      docusate sodium (COLACE) 100 MG capsule Take 100 mg by mouth 2 (two) times daily as needed for Constipation.      fluticasone propionate (FLONASE) 50 mcg/actuation nasal spray by Each Nostril route.      furosemide (LASIX) 20 MG tablet Take 20 mg by mouth once daily.      lovastatin (MEVACOR) 20 MG tablet Take 1 tablet (20 mg total) by mouth every evening. 30 tablet 0    metoprolol succinate (TOPROL-XL) 25 MG 24 hr tablet Take 1 tablet (25 mg total) by mouth once daily. 30 tablet 0    oxybutynin (DITROPAN) 5 MG Tab Take 5 mg by mouth 2 (two) times a day.      potassium chloride SA (K-DUR,KLOR-CON) 10 MEQ tablet Take 1 tablet (10 mEq total) by mouth 2 (two) times daily. 60 tablet 0    vitamin D (VITAMIN D3) 1000 units Tab Take 1,000 Units by mouth once daily.       No current facility-administered medications on file prior to encounter.

## 2023-05-23 NOTE — TRANSFER OF CARE
Anesthesia Transfer of Care Note    Patient: Aviva Gonzalez    Procedure(s) Performed: * No procedures listed *    Patient location: PACU    Anesthesia Type: general    Transport from OR: Transported from OR on room air with adequate spontaneous ventilation    Post pain: adequate analgesia    Post assessment: no apparent anesthetic complications and tolerated procedure well    Post vital signs: stable    Level of consciousness: responds to stimulation    Nausea/Vomiting: no nausea/vomiting    Complications: none    Transfer of care protocol was followedComments: Report Given to PACU rn VSS      Last vitals:   Visit Vitals  BP (!) 163/76 (BP Location: Right arm, Patient Position: Lying)   Pulse 66   Temp 36.4 °C (97.5 °F) (Oral)   Resp 17   SpO2 98%   Breastfeeding No

## 2023-05-23 NOTE — DISCHARGE INSTRUCTIONS
Procedure Date  5/23/23     Impression  Overall Impression:   - Small hiatal hernia  - The esophagus was otherwise normal, biopsied (dysphagia)   - The exam was otherwise normal  - No stricture, esophagitis, peptic ulcer, bezoar, GOO, or mass  - Empiric Savary dilation to 51-Fr performed with no complications      Recommendation    Await pathology results  If globus sensation persists, consider ENT referral for evaluation   Due to technical difficulties, I could not attach images to the report, but they do appear to be saved and can likely be viewed in Epic; if not, we will ask our IT team to make these available in Epic.      Outcome of procedure: successful EGD  Disposition: patient to recovery following procedure; discharge to home when appropriate parameters met  Provisions for follow up: please call my office for any unexpected symptoms like chest or abdominal pain or bleeding following your procedure.  Final Diagnosis: dysphagia, globus sensation     NO DRIVING, OPERATING EQUIPMENT, OR SIGNING LEGAL DOCUMENTS FOR 24 HOURS.     THE NURSE WILL CALL YOU WITH YOUR BIOPSY RESULTS IN A FEW DAYS.

## 2023-05-24 LAB
DHEA SERPL-MCNC: NORMAL
ESTROGEN SERPL-MCNC: NORMAL PG/ML
INSULIN SERPL-ACNC: NORMAL U[IU]/ML
LAB AP GROSS DESCRIPTION: NORMAL
LAB AP LABORATORY NOTES: NORMAL
T3RU NFR SERPL: NORMAL %

## 2023-05-25 DIAGNOSIS — B96.81 HELICOBACTER PYLORI GASTRITIS: Primary | ICD-10-CM

## 2023-05-25 DIAGNOSIS — K29.70 HELICOBACTER PYLORI GASTRITIS: Primary | ICD-10-CM

## 2023-05-25 RX ORDER — TETRACYCLINE HYDROCHLORIDE 500 MG/1
500 CAPSULE ORAL EVERY 6 HOURS
Qty: 56 CAPSULE | Refills: 0 | Status: SHIPPED | OUTPATIENT
Start: 2023-05-25 | End: 2023-06-08

## 2023-05-25 RX ORDER — METRONIDAZOLE 500 MG/1
500 TABLET ORAL EVERY 8 HOURS
Qty: 42 TABLET | Refills: 0 | Status: SHIPPED | OUTPATIENT
Start: 2023-05-25 | End: 2023-06-08

## 2023-05-25 RX ORDER — BISMUTH SUBSALICYLATE 262 MG/1
1 TABLET ORAL EVERY 6 HOURS
Qty: 56 TABLET | Refills: 0 | COMMUNITY
Start: 2023-05-25 | End: 2023-06-08

## 2023-05-25 RX ORDER — PANTOPRAZOLE SODIUM 40 MG/1
40 TABLET, DELAYED RELEASE ORAL 2 TIMES DAILY
Qty: 28 TABLET | Refills: 0 | Status: SHIPPED | OUTPATIENT
Start: 2023-05-25 | End: 2024-02-29

## 2023-05-25 NOTE — PROGRESS NOTES
Dr. Winter, thank you for referring this patient to me. I am treating her for H pylori gastritis. I performed a dilation to see if her swallowing improves. Please let me know if you have any questions regarding this patient.

## 2023-06-09 DIAGNOSIS — Z71.89 COMPLEX CARE COORDINATION: ICD-10-CM

## 2023-07-11 ENCOUNTER — OFFICE VISIT (OUTPATIENT)
Dept: GASTROENTEROLOGY | Facility: CLINIC | Age: 88
End: 2023-07-11
Payer: MEDICARE

## 2023-07-11 VITALS
HEIGHT: 64 IN | WEIGHT: 160 LBS | HEART RATE: 69 BPM | BODY MASS INDEX: 27.31 KG/M2 | DIASTOLIC BLOOD PRESSURE: 79 MMHG | OXYGEN SATURATION: 98 % | SYSTOLIC BLOOD PRESSURE: 112 MMHG

## 2023-07-11 DIAGNOSIS — K29.70 HELICOBACTER PYLORI GASTRITIS: Primary | ICD-10-CM

## 2023-07-11 DIAGNOSIS — B96.81 HELICOBACTER PYLORI GASTRITIS: Primary | ICD-10-CM

## 2023-07-11 PROCEDURE — 1101F PR PT FALLS ASSESS DOC 0-1 FALLS W/OUT INJ PAST YR: ICD-10-PCS | Mod: CPTII,,,

## 2023-07-11 PROCEDURE — 99213 PR OFFICE/OUTPT VISIT, EST, LEVL III, 20-29 MIN: ICD-10-PCS | Mod: S$PBB,,,

## 2023-07-11 PROCEDURE — 1160F PR REVIEW ALL MEDS BY PRESCRIBER/CLIN PHARMACIST DOCUMENTED: ICD-10-PCS | Mod: CPTII,,,

## 2023-07-11 PROCEDURE — 1160F RVW MEDS BY RX/DR IN RCRD: CPT | Mod: CPTII,,,

## 2023-07-11 PROCEDURE — 1159F PR MEDICATION LIST DOCUMENTED IN MEDICAL RECORD: ICD-10-PCS | Mod: CPTII,,,

## 2023-07-11 PROCEDURE — 99214 OFFICE O/P EST MOD 30 MIN: CPT | Mod: PBBFAC

## 2023-07-11 PROCEDURE — 1101F PT FALLS ASSESS-DOCD LE1/YR: CPT | Mod: CPTII,,,

## 2023-07-11 PROCEDURE — 1159F MED LIST DOCD IN RCRD: CPT | Mod: CPTII,,,

## 2023-07-11 PROCEDURE — 3288F FALL RISK ASSESSMENT DOCD: CPT | Mod: CPTII,,,

## 2023-07-11 PROCEDURE — 99213 OFFICE O/P EST LOW 20 MIN: CPT | Mod: S$PBB,,,

## 2023-07-11 PROCEDURE — 3288F PR FALLS RISK ASSESSMENT DOCUMENTED: ICD-10-PCS | Mod: CPTII,,,

## 2023-07-11 NOTE — PROGRESS NOTES
Aviva Gonzalez is a 87 y.o. female here for Follow-up (3 month)        PCP: Chloé Winter  Referring Provider: No referring provider defined for this encounter.     HPI:  Ms. Gonzalez is an 88 yo female who presents to clinic today for follow-up after EGD w/ dilation. Her dysphagia improved following dilation. Pathology revealed + H pylori infection. Patient was prescribed quadruple therapy and took the entire course of treatment per her report. She has been off PPI therapy for approximately 3 weeks. She denies any GI related complaints or problems at present. She reports her appetite is well. Denies any abdominal pain, nausea, vomiting, reflux, constipation, diarrhea, hematochezia or melena.         ROS:  Review of Systems   Constitutional:  Negative for appetite change, fatigue, fever and unexpected weight change.   HENT:  Negative for trouble swallowing.    Respiratory:  Negative for shortness of breath.    Cardiovascular:  Negative for chest pain.   Gastrointestinal:  Negative for abdominal pain, blood in stool, constipation, diarrhea, nausea, vomiting and reflux.   Musculoskeletal:  Positive for gait problem and leg pain.   Integumentary:  Negative for color change.        PMHX:  has a past medical history of Cancer, Constipation, Hypercholesteremia, Hypertension, Hypokalemia, Renal disorder, Rheumatoid arthritis, unspecified, Seasonal allergies, Urinary frequency, and Vitamin D deficiency.    PSHX:  has a past surgical history that includes Breast surgery (Left).    PFHX: family history includes Breast cancer in her daughter; Diabetes in her mother; Hypertension in her mother.    PSlHX:  reports that she has never smoked. Her smokeless tobacco use includes chew. She reports that she does not drink alcohol and does not use drugs.        Review of patient's allergies indicates:   Allergen Reactions    Penicillins Rash       Medication List with Changes/Refills   Current Medications    ADVAIR DISKUS  "250-50 MCG/DOSE DISKUS INHALER    Inhale 1 puff into the lungs 2 (two) times daily.    CETIRIZINE (ZYRTEC) 10 MG TABLET    Take 1 tablet (10 mg total) by mouth once daily.    CHOLECALCIFEROL, VITAMIN D3, (VITAMIN D3) 10 MCG (400 UNIT) TAB    Take 1 tablet (400 Units total) by mouth once daily.    DICLOFENAC SODIUM (VOLTAREN) 1 % GEL    Apply topically.    DOCUSATE SODIUM (COLACE) 100 MG CAPSULE    Take 100 mg by mouth 2 (two) times daily as needed for Constipation.    FLUTICASONE PROPIONATE (FLONASE) 50 MCG/ACTUATION NASAL SPRAY    by Each Nostril route.    FUROSEMIDE (LASIX) 20 MG TABLET    Take 20 mg by mouth once daily.    LOVASTATIN (MEVACOR) 20 MG TABLET    Take 1 tablet (20 mg total) by mouth every evening.    METOPROLOL SUCCINATE (TOPROL-XL) 25 MG 24 HR TABLET    Take 1 tablet (25 mg total) by mouth once daily.    OXYBUTYNIN (DITROPAN) 5 MG TAB    Take 5 mg by mouth 2 (two) times a day.    PANTOPRAZOLE (PROTONIX) 40 MG TABLET    Take 1 tablet (40 mg total) by mouth 2 (two) times daily. for 14 days    POTASSIUM CHLORIDE SA (K-DUR,KLOR-CON) 10 MEQ TABLET    Take 1 tablet (10 mEq total) by mouth 2 (two) times daily.    VITAMIN D (VITAMIN D3) 1000 UNITS TAB    Take 1,000 Units by mouth once daily.        Objective Findings:  Vital Signs:  /79   Pulse 69   Ht 5' 4" (1.626 m)   Wt 72.6 kg (160 lb) Comment: est wt per pt statement; pt in w/c  SpO2 98%   BMI 27.46 kg/m²  Body mass index is 27.46 kg/m².    Physical Exam:  Physical Exam  Vitals reviewed.   Constitutional:       General: She is not in acute distress.     Appearance: Normal appearance.   HENT:      Mouth/Throat:      Mouth: Mucous membranes are moist.   Cardiovascular:      Rate and Rhythm: Normal rate.   Pulmonary:      Effort: Pulmonary effort is normal.   Abdominal:      General: Bowel sounds are normal. There is no distension.      Palpations: Abdomen is soft.      Tenderness: There is no abdominal tenderness. There is no guarding. "   Skin:     General: Skin is warm and dry.   Neurological:      Mental Status: She is alert and oriented to person, place, and time.   Psychiatric:         Mood and Affect: Mood normal.        Labs:  No results found for: WBC, HGB, HCT, MCV, RDW, PLT, GRAN, LYMPH, MONO, EOS, BASO  No results found for: NA, K, CL, CO2, GLU, BUN, CREATININE, CALCIUM, PROT, ALBUMIN, BILITOT, ALKPHOS, AST, ALT      Imaging: No results found.      Assessment:  Aviva Gonzalez is a 87 y.o. female here with:  1. Helicobacter pylori gastritis          Recommendations:  1. H pylori breath test for test of cure  2. RTC PRN - may repeat dilation in the future if dysphagia returns     Follow up if symptoms return or you experience any new GI related symptoms.      Order summary:  Orders Placed This Encounter    H. pylori Breath Test       Thank you for allowing me to participate in the care of Aviva Gonzalez.      Carlota Rich, FNP-BC, GILLIAN-ACNP-BC

## 2023-07-11 NOTE — PATIENT INSTRUCTIONS
I have placed the order for your H pylori breath test that will be performed in the lab on the 1st floor of the main clinic building where our pharmacy is located. You may stop by any day/time to have this test performed. If your symptoms of dysphagia return or you experience any new GI related symptoms, call my office to schedule a visit.     [phone]

## 2023-07-13 DIAGNOSIS — D64.9 ANEMIA, UNSPECIFIED TYPE: Primary | ICD-10-CM

## 2023-10-19 ENCOUNTER — HOSPITAL ENCOUNTER (EMERGENCY)
Facility: HOSPITAL | Age: 88
Discharge: HOME | End: 2023-10-19
Payer: MEDICARE

## 2023-10-19 VITALS
TEMPERATURE: 98 F | HEART RATE: 79 BPM | RESPIRATION RATE: 18 BRPM | WEIGHT: 172 LBS | SYSTOLIC BLOOD PRESSURE: 140 MMHG | BODY MASS INDEX: 29.52 KG/M2 | OXYGEN SATURATION: 97 % | DIASTOLIC BLOOD PRESSURE: 68 MMHG

## 2023-10-19 DIAGNOSIS — E86.0 DEHYDRATION: ICD-10-CM

## 2023-10-19 DIAGNOSIS — N39.0 URINARY TRACT INFECTION WITH HEMATURIA, SITE UNSPECIFIED: Primary | ICD-10-CM

## 2023-10-19 DIAGNOSIS — R30.0 DYSURIA: ICD-10-CM

## 2023-10-19 DIAGNOSIS — R31.9 URINARY TRACT INFECTION WITH HEMATURIA, SITE UNSPECIFIED: Primary | ICD-10-CM

## 2023-10-19 LAB
ALBUMIN SERPL BCP-MCNC: 2.8 G/DL (ref 3.5–5)
ALBUMIN/GLOB SERPL: 0.8 {RATIO}
ALP SERPL-CCNC: 78 U/L (ref 55–142)
ALT SERPL W P-5'-P-CCNC: 17 U/L (ref 13–56)
ANION GAP SERPL CALCULATED.3IONS-SCNC: 19 MMOL/L (ref 7–16)
AST SERPL W P-5'-P-CCNC: 13 U/L (ref 15–37)
BACTERIA #/AREA URNS HPF: ABNORMAL /HPF
BASOPHILS # BLD AUTO: 0.01 K/UL (ref 0–0.2)
BASOPHILS NFR BLD AUTO: 0.2 % (ref 0–1)
BILIRUB SERPL-MCNC: 0.4 MG/DL (ref ?–1.2)
BILIRUB UR QL STRIP: NEGATIVE
BUN SERPL-MCNC: 30 MG/DL (ref 7–18)
BUN/CREAT SERPL: 19 (ref 6–20)
CALCIUM SERPL-MCNC: 7.8 MG/DL (ref 8.5–10.1)
CHLORIDE SERPL-SCNC: 112 MMOL/L (ref 98–107)
CLARITY UR: ABNORMAL
CO2 SERPL-SCNC: 20 MMOL/L (ref 21–32)
COLOR UR: ABNORMAL
CREAT SERPL-MCNC: 1.62 MG/DL (ref 0.55–1.02)
DIFFERENTIAL METHOD BLD: ABNORMAL
EGFR (NO RACE VARIABLE) (RUSH/TITUS): 30 ML/MIN/1.73M2
EOSINOPHIL # BLD AUTO: 0.09 K/UL (ref 0–0.5)
EOSINOPHIL NFR BLD AUTO: 1.8 % (ref 1–4)
ERYTHROCYTE [DISTWIDTH] IN BLOOD BY AUTOMATED COUNT: 14.4 % (ref 11.5–14.5)
GLOBULIN SER-MCNC: 3.7 G/DL (ref 2–4)
GLUCOSE SERPL-MCNC: 112 MG/DL (ref 74–106)
GLUCOSE UR STRIP-MCNC: NEGATIVE MG/DL
HCT VFR BLD AUTO: 28.6 % (ref 38–47)
HGB BLD-MCNC: 9.3 G/DL (ref 12–16)
KETONES UR STRIP-SCNC: NEGATIVE MG/DL
LEUKOCYTE ESTERASE UR QL STRIP: ABNORMAL
LYMPHOCYTES # BLD AUTO: 0.98 K/UL (ref 1–4.8)
LYMPHOCYTES NFR BLD AUTO: 19.6 % (ref 27–41)
MCH RBC QN AUTO: 32.7 PG (ref 27–31)
MCHC RBC AUTO-ENTMCNC: 32.5 G/DL (ref 32–36)
MCV RBC AUTO: 100.7 FL (ref 80–96)
MONOCYTES # BLD AUTO: 0.59 K/UL (ref 0–0.8)
MONOCYTES NFR BLD AUTO: 11.8 % (ref 2–6)
MPC BLD CALC-MCNC: 10.7 FL (ref 9.4–12.4)
MUCOUS THREADS #/AREA URNS HPF: ABNORMAL /HPF
NEUTROPHILS # BLD AUTO: 3.32 K/UL (ref 1.8–7.7)
NEUTROPHILS NFR BLD AUTO: 66.6 % (ref 53–65)
NITRITE UR QL STRIP: NEGATIVE
PH UR STRIP: 7 PH UNITS
PLATELET # BLD AUTO: 193 K/UL (ref 150–400)
POTASSIUM SERPL-SCNC: 3.5 MMOL/L (ref 3.5–5.1)
PROT SERPL-MCNC: 6.5 G/DL (ref 6.4–8.2)
PROT UR QL STRIP: >=300
RBC # BLD AUTO: 2.84 M/UL (ref 4.2–5.4)
RBC # UR STRIP: ABNORMAL /UL
RBC #/AREA URNS HPF: ABNORMAL /HPF
SODIUM SERPL-SCNC: 147 MMOL/L (ref 136–145)
SP GR UR STRIP: 1.02
SQUAMOUS #/AREA URNS LPF: ABNORMAL /LPF
UROBILINOGEN UR STRIP-ACNC: 0.2 MG/DL
WBC # BLD AUTO: 4.99 K/UL (ref 4.5–11)
WBC #/AREA URNS HPF: ABNORMAL /HPF
WBC CLUMPS, UA: ABNORMAL /HPF

## 2023-10-19 PROCEDURE — 87077 CULTURE AEROBIC IDENTIFY: CPT | Mod: 59

## 2023-10-19 PROCEDURE — 99284 EMERGENCY DEPT VISIT MOD MDM: CPT | Mod: 25

## 2023-10-19 PROCEDURE — 81001 URINALYSIS AUTO W/SCOPE: CPT

## 2023-10-19 PROCEDURE — 63600175 PHARM REV CODE 636 W HCPCS

## 2023-10-19 PROCEDURE — 87186 SC STD MICRODIL/AGAR DIL: CPT

## 2023-10-19 PROCEDURE — 25000003 PHARM REV CODE 250

## 2023-10-19 PROCEDURE — P9612 CATHETERIZE FOR URINE SPEC: HCPCS

## 2023-10-19 PROCEDURE — 85025 COMPLETE CBC W/AUTO DIFF WBC: CPT

## 2023-10-19 PROCEDURE — 99284 EMERGENCY DEPT VISIT MOD MDM: CPT | Mod: GF,,,

## 2023-10-19 PROCEDURE — 96374 THER/PROPH/DIAG INJ IV PUSH: CPT

## 2023-10-19 PROCEDURE — 99284 PR EMERGENCY DEPT VISIT,LEVEL IV: ICD-10-PCS | Mod: GF,,,

## 2023-10-19 PROCEDURE — 80053 COMPREHEN METABOLIC PANEL: CPT

## 2023-10-19 RX ORDER — MEGESTROL ACETATE 40 MG/1
40 TABLET ORAL DAILY
COMMUNITY
End: 2024-02-05

## 2023-10-19 RX ORDER — CEPHALEXIN 500 MG/1
500 CAPSULE ORAL EVERY 12 HOURS
Qty: 14 CAPSULE | Refills: 0 | Status: SHIPPED | OUTPATIENT
Start: 2023-10-19 | End: 2023-10-26

## 2023-10-19 RX ORDER — FERROUS SULFATE 325(65) MG
325 TABLET, DELAYED RELEASE (ENTERIC COATED) ORAL 2 TIMES DAILY
COMMUNITY

## 2023-10-19 RX ADMIN — SODIUM CHLORIDE 500 ML: 9 INJECTION, SOLUTION INTRAVENOUS at 11:10

## 2023-10-19 RX ADMIN — CEFTRIAXONE SODIUM 1 G: 1 INJECTION, POWDER, FOR SOLUTION INTRAMUSCULAR; INTRAVENOUS at 12:10

## 2023-10-19 NOTE — ED PROVIDER NOTES
"Encounter Date: 10/19/2023       History     Chief Complaint   Patient presents with    Dysuria     States ongoing for 2 weeks, and when asked why she hasn't come before now she states "I can't get anyone to bring me"  grandson brought her today     Patient is a 88-year-old female who presents to the emergency department with complaints of dysuria x2 weeks.  She was sent by her primary care provider's office today because they were unable to obtain a urine sample.  She is accompanied to the emergency department today by her daughter.  She reports that she does live alone but has sitters that come by along with home health.  She is had multiple urinary tract infections in the past as evidenced by her previous encounter visits.  She is had no treatment for her symptoms prior to arrival to the emergency department today.  She reports that she is tolerating p.o. well and has been eating and drinking like normal.  She is alert and oriented at the time of the visit.  She does have a history of breast cancer, hypercholesteremia, hypertension, chronic kidney disease, arthritis, and prior urinary tract infections.  She denies any weakness, dizziness, confusion, fever, chills, chest pain, shortness of breath, back pain, or any other complaints.  She is resting comfortably at the time of the exam.  Vital signs are within normal limits.  She appears in no acute distress.    The history is provided by the patient.     Review of patient's allergies indicates:   Allergen Reactions    Penicillins Rash     Past Medical History:   Diagnosis Date    Cancer     breast    Constipation     Hypercholesteremia     Hypertension     Hypokalemia     Renal disorder     Rheumatoid arthritis, unspecified     Seasonal allergies     Urinary frequency     Vitamin D deficiency      Past Surgical History:   Procedure Laterality Date    BREAST SURGERY Left      Family History   Problem Relation Age of Onset    Hypertension Mother     Diabetes Mother  "    Breast cancer Daughter      Social History     Tobacco Use    Smoking status: Never    Smokeless tobacco: Current     Types: Chew   Substance Use Topics    Alcohol use: Never    Drug use: Never     Review of Systems   Constitutional:  Negative for activity change, appetite change, chills, fatigue and fever.   HENT:  Negative for congestion, sore throat and trouble swallowing.    Respiratory:  Negative for cough and shortness of breath.    Cardiovascular:  Negative for chest pain and palpitations.   Gastrointestinal:  Negative for abdominal pain, constipation, diarrhea, nausea and vomiting.   Genitourinary:  Positive for dysuria, frequency and urgency. Negative for decreased urine volume, difficulty urinating, flank pain, hematuria, vaginal bleeding, vaginal discharge and vaginal pain.   Musculoskeletal:  Negative for arthralgias, back pain, neck pain and neck stiffness.   Skin:  Negative for color change, pallor and rash.   Neurological:  Negative for dizziness, tremors, seizures, syncope, facial asymmetry, speech difficulty, weakness, light-headedness, numbness and headaches.   Psychiatric/Behavioral:  Negative for confusion. The patient is not nervous/anxious.    All other systems reviewed and are negative.      Physical Exam     Initial Vitals [10/19/23 1017]   BP Pulse Resp Temp SpO2   139/66 87 20 97.9 °F (36.6 °C) 98 %      MAP       --         Physical Exam    Nursing note and vitals reviewed.  Constitutional: She appears well-developed and well-nourished. No distress.   HENT:   Head: Normocephalic and atraumatic.   Mouth/Throat: Oropharynx is clear and moist.   Eyes: Conjunctivae and EOM are normal. Pupils are equal, round, and reactive to light.   Neck: Neck supple.   Normal range of motion.  Cardiovascular:  Normal rate, regular rhythm and normal heart sounds.           Pulmonary/Chest: Breath sounds normal. No respiratory distress. She has no wheezes. She has no rhonchi. She has no rales. She exhibits  no tenderness.   Abdominal: Abdomen is soft. Bowel sounds are normal. She exhibits no distension. There is no abdominal tenderness. There is no rebound and no guarding.   Musculoskeletal:         General: Normal range of motion.      Cervical back: Normal range of motion and neck supple.     Neurological: She is alert and oriented to person, place, and time. She has normal strength. GCS score is 15. GCS eye subscore is 4. GCS verbal subscore is 5. GCS motor subscore is 6.   Skin: Skin is warm and dry. Capillary refill takes less than 2 seconds.   Psychiatric: She has a normal mood and affect. Her behavior is normal. Judgment and thought content normal.         Medical Screening Exam   See Full Note    ED Course   Procedures  Labs Reviewed   COMPREHENSIVE METABOLIC PANEL - Abnormal; Notable for the following components:       Result Value    Sodium 147 (*)     Chloride 112 (*)     CO2 20 (*)     Anion Gap 19 (*)     Glucose 112 (*)     BUN 30 (*)     Creatinine 1.62 (*)     Calcium 7.8 (*)     Albumin 2.8 (*)     AST 13 (*)     eGFR 30 (*)     All other components within normal limits   URINALYSIS, REFLEX TO URINE CULTURE - Abnormal; Notable for the following components:    Leukocytes, UA Large (*)     Protein, UA >=300 (*)     Blood, UA Moderate (*)     All other components within normal limits   CBC WITH DIFFERENTIAL - Abnormal; Notable for the following components:    RBC 2.84 (*)     Hemoglobin 9.3 (*)     Hematocrit 28.6 (*)     .7 (*)     MCH 32.7 (*)     Neutrophils % 66.6 (*)     Lymphocytes % 19.6 (*)     Lymphocytes, Absolute 0.98 (*)     Monocytes % 11.8 (*)     All other components within normal limits   URINALYSIS, MICROSCOPIC - Abnormal; Notable for the following components:    WBC, UA Too Numerous To Count (*)     RBC, UA 10-15 (*)     Bacteria, UA Many (*)     Squamous Epithelial Cells, UA Few (*)     WBC Clumps Moderate (*)     Mucus, UA Few (*)     All other components within normal limits    CULTURE, URINE   CBC W/ AUTO DIFFERENTIAL    Narrative:     The following orders were created for panel order CBC auto differential.  Procedure                               Abnormality         Status                     ---------                               -----------         ------                     CBC with Differential[[id number]]        Abnormal            Final result                 Please view results for these tests on the individual orders.          Imaging Results    None          Medications   cefTRIAXone (ROCEPHIN) 1 g in dextrose 5 % in water (D5W) 100 mL IVPB (MB+) (1 g Intravenous New Bag 10/19/23 1201)   sodium chloride 0.9% bolus 500 mL 500 mL (500 mLs Intravenous New Bag 10/19/23 1153)     Medical Decision Making  Patient is sent to the emergency room from the clinic for evaluation of dysuria x2 weeks.  They report they were unable to obtain a urinary sample.  Her mental status is at baseline.  She is accompanied by her daughter.  She does have home health and sitter as available at home to assist her with care.  After review of prior labs she is noted to have chronic decreased renal function.  We will initiate a saline lock and obtain lab specimens for further evaluation.  We will also obtain a urine sample to rule out any urinary tract infection.     Review of labs does show some dehydration along with her chronic kidney disease.  It also is positive for urinary tract infection.  She does report a prior history of penicillin which caused a rash when she was a child.  She has a history of anaphylactic reaction.  We will attempt to cephalosporin therapy while in the emergency department today.  Patient and her daughter were informed of the risks and are in agreement.  We will watch her closely for any developing reaction.  We will treat her with Rocephin 1 g IV along with only 500 mL of normal saline IV for mild dehydration due to the patient's history of CHF.  She is nontoxic in appearance.   She is resting comfortably at this time.  The patient would opt to try treatment as an outpatient at this time. The importance of antibiotic therapy moving forward and ensuring that she completes all of her antibiotic prescription was explained to the patient and her family in detail.  It was also discussed that she will need to follow up Monday for a recheck with the primary care provider to likely have repeat urine and blood work at that time if they feel it is indicated.  They were also instructed to monitor her closely for any worsening symptoms such as fever, chills, altered mental status, back pain, nausea, vomiting, or any other worrisome complaints.  If these develop they were recommended to bring her back to the emergency department for a recheck and possible admission at that time.  They were also instructed to have the patient increase her oral fluids to maintain proper hydration and help flush the infection from her bladder.  They verbalized understanding and are in agreement with this plan.    Amount and/or Complexity of Data Reviewed  Independent Historian:      Details: Patient is a 88-year-old female who presents to the emergency department with complaints of dysuria x2 weeks.  She was sent by her primary care provider's office today because they were unable to obtain a urine sample.  She is accompanied to the emergency department today by her daughter.  She reports that she does live alone but has sitters that come by along with home health.  She is had multiple urinary tract infections in the past as evidenced by her previous encounter visits.  She is had no treatment for her symptoms prior to arrival to the emergency department today.  She reports that she is tolerating p.o. well and has been eating and drinking like normal.  She is alert and oriented at the time of the visit.  She does have a history of breast cancer, hypercholesteremia, hypertension, chronic kidney disease, arthritis, and prior  urinary tract infections.  She denies any weakness, dizziness, confusion, fever, chills, chest pain, shortness of breath, back pain, or any other complaints.  She is resting comfortably at the time of the exam.  Vital signs are within normal limits.  She appears in no acute distress.  External Data Reviewed: labs and notes.     Details: Review of prior notes lab work show a chronically decreased GFR and elevated creatinine.  Prior urine cultures were also noted to be greater than 100,000 E coli and showed resistance to Bactrim.  Due to her kidney function and prior resistance history we will treat with Rocephin in the emergency department and follow up with Keflex until urine culture is resulted.  This was explained to the patient and her family in detail.  They verbalized understanding.  Labs: ordered.     Details: CBC shows a WBC of 4.99, H&H of 9 and 28 (this is baseline compared to her labs from 3 months prior.).  CMP shows a sodium of 147, chloride of 112, CO2 of 20 BUN 30, creatinine of 1.62, and GFR of 30 (this was also compared to her baseline and noted only in mild difference from most recent lab work).  Urinalysis is positive for large leukocytes, proteinuria, and moderate blood, microscopic urinalysis does show too numerous to count WBCs many bacteria, and WBC clumps along with some mucus.  Urine culture was sent and is pending at this time.    Risk  Risk Details: The presentation is NOT consistent with pyelonephritis, sepsis, or sterile pyuria..    Given the large differential diagnosis, the decision making in this case is of high complexity.    After evaluating all of the data points in this case, the presentation of Aviva Gonzalez is NOT consistent with AAA; Mesenteric Ischemia; Bowel Perforation; Bowel Obstruction; Sigmoid Volvulus; Diverticulitis; Appendicitis; Peritonitis; Cholecystitis, ascending cholangitis or other gallbladder disease; perforated ulcer; significant GI bleeding, splenic  rupture/infarction; Hepatic abscess; or other surgical/acute abdomen.    Similarly, this presentation is NOT consistent with ACS or Myocardial Ischemia or cardiac etiology; Pulmonary Embolism; fistula; incarcerated hernia; Pancreatitis, Aortic Dissection; Diabetic Ketoacidosis; Kidney Stone; Ischemic colitis; Psoas or other abscess; Methanol poisoning; Heavy metal toxicity; or porphyria.    Similarly, this case is NOT consistent with [id number]-[id number] Syndrome, Ectopic Pregnancy, Placental Abruption, PID, Tubo-ovarian abscess, Ovarian Torsion, or STI.    Similarly, this presentation is NOT consistent with acute coronary syndrome, pulmonary embolism, dissection, borhaave's, arrythmia, pneumothorax, cardiac tamponade, or other emergent cardiopulmonary condition.    Similarly, this presentation is NOT consistent with pneumonia, or other focal bacterial infection.    Strict return and follow-up precautions have been given by me personally to the patient/family/caregiver(s).    Data Reviewed/Counseling: I have reviewed the patient's vital signs, nursing notes, and other relevant tests/information. I had a detailed discussion regarding the historical points, exam findings, and any diagnostic results supporting the discharge diagnosis. I also discussed the need for outpatient follow-up and the need to return to the ED if symptoms worsen or if there are any questions or concerns that arise at home.     Patient tolerated IV Rocephin in the emergency department without any complications.  We will continue with the plan of outpatient Keflex until urine culture is resulted.  This was once again explained to the patient and her family.  They verbalized understanding and are in agreement with this plan.    Dx:  Dysuria/ urinary tract infection with hematuria, site unspecified/dehydration                               Clinical Impression:   Final diagnoses:  [R30.0] Dysuria  [N39.0, R31.9] Urinary tract infection with hematuria,  site unspecified (Primary)  [E86.0] Dehydration        ED Disposition Condition    Discharge Stable          ED Prescriptions       Medication Sig Dispense Start Date End Date Auth. Provider    cephALEXin (KEFLEX) 500 MG capsule Take 1 capsule (500 mg total) by mouth every 12 (twelve) hours. for 7 days 14 capsule 10/19/2023 10/26/2023 Farooq Conteh FNP          Follow-up Information       Follow up With Specialties Details Why Contact Info    Chloé Winter MD Family Medicine Schedule an appointment as soon as possible for a visit in 2 days  [address]  [facility] - [place] [address]  [phone]               Farooq Conteh FNP  10/19/23 1227

## 2023-10-19 NOTE — DISCHARGE INSTRUCTIONS
Your lab work today does show some dehydration and a urinary tract infection.  We have treated this in the emergency department with IV fluids and antibiotics.  However, you will need further antibiotic therapy.  Please take these antibiotics as directed.  Please complete the entire prescription even if you feel better.  It we will also be necessary if you to increase oral fluids to maintain proper hydration.  Arrange for a follow up in 1-2 days with your primary care provider for a recheck.  Return to the emergency department for fever, chills, weakness, numbness, tingling, nausea, vomiting, or any other new or worrisome symptoms.

## 2023-10-22 LAB
UA COMPLETE W REFLEX CULTURE PNL UR: ABNORMAL
UA COMPLETE W REFLEX CULTURE PNL UR: ABNORMAL

## 2024-02-05 ENCOUNTER — HOSPITAL ENCOUNTER (EMERGENCY)
Facility: HOSPITAL | Age: 89
Discharge: HOME | End: 2024-02-05
Attending: EMERGENCY MEDICINE
Payer: MEDICARE

## 2024-02-05 VITALS
DIASTOLIC BLOOD PRESSURE: 63 MMHG | HEART RATE: 68 BPM | OXYGEN SATURATION: 98 % | RESPIRATION RATE: 18 BRPM | BODY MASS INDEX: 27.31 KG/M2 | WEIGHT: 160 LBS | HEIGHT: 64 IN | SYSTOLIC BLOOD PRESSURE: 111 MMHG | TEMPERATURE: 97 F

## 2024-02-05 DIAGNOSIS — R60.9 EDEMA: Primary | ICD-10-CM

## 2024-02-05 DIAGNOSIS — L03.115 CELLULITIS OF RIGHT LOWER EXTREMITY: ICD-10-CM

## 2024-02-05 LAB
ALBUMIN SERPL BCP-MCNC: 3.6 G/DL (ref 3.5–5)
ALBUMIN/GLOB SERPL: 1 {RATIO}
ALP SERPL-CCNC: 88 U/L (ref 55–142)
ALT SERPL W P-5'-P-CCNC: 19 U/L (ref 13–56)
ANION GAP SERPL CALCULATED.3IONS-SCNC: 12 MMOL/L (ref 7–16)
AST SERPL W P-5'-P-CCNC: 15 U/L (ref 15–37)
BASOPHILS # BLD AUTO: 0 K/UL (ref 0–0.2)
BASOPHILS NFR BLD AUTO: 0 % (ref 0–1)
BILIRUB SERPL-MCNC: 0.5 MG/DL (ref ?–1.2)
BUN SERPL-MCNC: 32 MG/DL (ref 7–18)
BUN/CREAT SERPL: 16 (ref 6–20)
CALCIUM SERPL-MCNC: 8.6 MG/DL (ref 8.5–10.1)
CHLORIDE SERPL-SCNC: 108 MMOL/L (ref 98–107)
CO2 SERPL-SCNC: 27 MMOL/L (ref 21–32)
CREAT SERPL-MCNC: 1.94 MG/DL (ref 0.55–1.02)
DIFFERENTIAL METHOD BLD: ABNORMAL
EGFR (NO RACE VARIABLE) (RUSH/TITUS): 25 ML/MIN/1.73M2
EOSINOPHIL # BLD AUTO: 0.58 K/UL (ref 0–0.5)
EOSINOPHIL NFR BLD AUTO: 8.7 % (ref 1–4)
EOSINOPHIL NFR BLD MANUAL: 9 % (ref 1–4)
ERYTHROCYTE [DISTWIDTH] IN BLOOD BY AUTOMATED COUNT: 14.3 % (ref 11.5–14.5)
GLOBULIN SER-MCNC: 3.5 G/DL (ref 2–4)
GLUCOSE SERPL-MCNC: 167 MG/DL (ref 74–106)
HCT VFR BLD AUTO: 34.5 % (ref 38–47)
HGB BLD-MCNC: 11 G/DL (ref 12–16)
HYPOCHROMIA BLD QL SMEAR: ABNORMAL
LYMPHOCYTES # BLD AUTO: 0.62 K/UL (ref 1–4.8)
LYMPHOCYTES NFR BLD AUTO: 9.3 % (ref 27–41)
LYMPHOCYTES NFR BLD MANUAL: 14 % (ref 27–41)
MCH RBC QN AUTO: 32.4 PG (ref 27–31)
MCHC RBC AUTO-ENTMCNC: 31.9 G/DL (ref 32–36)
MCV RBC AUTO: 101.8 FL (ref 80–96)
MONOCYTES # BLD AUTO: 0.61 K/UL (ref 0–0.8)
MONOCYTES NFR BLD AUTO: 9.1 % (ref 2–6)
MONOCYTES NFR BLD MANUAL: 3 % (ref 2–6)
MPC BLD CALC-MCNC: 10.4 FL (ref 9.4–12.4)
NEUTROPHILS # BLD AUTO: 4.86 K/UL (ref 1.8–7.7)
NEUTROPHILS NFR BLD AUTO: 72.9 % (ref 53–65)
NEUTS SEG NFR BLD MANUAL: 74 % (ref 50–62)
NRBC BLD MANUAL-RTO: ABNORMAL %
OVALOCYTES BLD QL SMEAR: ABNORMAL
PLATELET # BLD AUTO: 168 K/UL (ref 150–400)
PLATELET MORPHOLOGY: NORMAL
POTASSIUM SERPL-SCNC: 3.8 MMOL/L (ref 3.5–5.1)
PROT SERPL-MCNC: 7.1 G/DL (ref 6.4–8.2)
RBC # BLD AUTO: 3.39 M/UL (ref 4.2–5.4)
SODIUM SERPL-SCNC: 143 MMOL/L (ref 136–145)
WBC # BLD AUTO: 6.67 K/UL (ref 4.5–11)

## 2024-02-05 PROCEDURE — 85025 COMPLETE CBC W/AUTO DIFF WBC: CPT | Performed by: EMERGENCY MEDICINE

## 2024-02-05 PROCEDURE — 99284 EMERGENCY DEPT VISIT MOD MDM: CPT | Mod: ,,, | Performed by: EMERGENCY MEDICINE

## 2024-02-05 PROCEDURE — 99284 EMERGENCY DEPT VISIT MOD MDM: CPT | Mod: 25

## 2024-02-05 PROCEDURE — 80053 COMPREHEN METABOLIC PANEL: CPT | Performed by: EMERGENCY MEDICINE

## 2024-02-05 RX ORDER — SULFAMETHOXAZOLE AND TRIMETHOPRIM 800; 160 MG/1; MG/1
1 TABLET ORAL 2 TIMES DAILY
Qty: 14 TABLET | Refills: 0 | Status: SHIPPED | OUTPATIENT
Start: 2024-02-05 | End: 2024-02-12

## 2024-02-05 RX ORDER — NITROFURANTOIN 25; 75 MG/1; MG/1
100 CAPSULE ORAL 2 TIMES DAILY
COMMUNITY
Start: 2024-02-01 | End: 2024-02-05

## 2024-02-05 RX ORDER — MUPIROCIN 20 MG/G
OINTMENT TOPICAL 3 TIMES DAILY
Qty: 15 G | Refills: 0 | Status: SHIPPED | OUTPATIENT
Start: 2024-02-05

## 2024-02-05 NOTE — ED PROVIDER NOTES
Encounter Date: 2/5/2024       History     Chief Complaint   Patient presents with    Leg Swelling     Leg redness onset this am     PT HAS RLE EDEMA AND AN AREA OF ERYTHEMA NOTED THIS AM. PT DENIES PAIN OR ADDITIONAL SYMPTOMS. PT DENIES KNOWN FEVER, INSECT BITE, TRAUMA OR PRIOR SIMILAR.  PT IS ON MACRODANTIN FOR UTI 1/31/2024 WITH NO GROWTH ON CULTURE.     Past Medical History    Diagnosis Date Comments  Hypertension [I10]    Renal disorder [N28.9]    Hypokalemia [E87.6]    Seasonal allergies [J30.2]    Rheumatoid arthritis, unspecified [M06.9]    Vitamin D deficiency [E55.9]    Constipation [K59.00]    Hypercholesteremia [E78.00]    Urinary frequency [R35.0]    Cancer [C80.1]  breast              Review of patient's allergies indicates:   Allergen Reactions    Penicillins Rash     Past Medical History:   Diagnosis Date    Cancer     breast    Constipation     Hypercholesteremia     Hypertension     Hypokalemia     Renal disorder     Rheumatoid arthritis, unspecified     Seasonal allergies     Urinary frequency     Vitamin D deficiency      Past Surgical History:   Procedure Laterality Date    BREAST SURGERY Left      Family History   Problem Relation Age of Onset    Hypertension Mother     Diabetes Mother     Breast cancer Daughter      Social History     Tobacco Use    Smoking status: Never    Smokeless tobacco: Current     Types: Chew   Substance Use Topics    Alcohol use: Never    Drug use: Never     Review of Systems   Constitutional: Negative.  Negative for appetite change and fatigue.   HENT: Negative.     Eyes: Negative.    Respiratory: Negative.     Endocrine: Negative.    Genitourinary: Negative.    Musculoskeletal:  Positive for arthralgias.   Skin:  Positive for color change.   Allergic/Immunologic: Negative for immunocompromised state.   Neurological: Negative.  Negative for weakness.   Psychiatric/Behavioral: Negative.     All other systems reviewed and are negative.      Physical Exam     Initial  Vitals [02/05/24 0807]   BP Pulse Resp Temp SpO2   112/60 80 18 97.5 °F (36.4 °C) 99 %      MAP       --         Physical Exam    Vitals reviewed.  Constitutional: She appears well-developed and well-nourished. She is cooperative. No distress.   HENT:   Head: Normocephalic and atraumatic.   Right Ear: External ear normal.   Left Ear: External ear normal.   Nose: Nose normal.   Mouth/Throat: Oropharynx is clear and moist. No oropharyngeal exudate.   Eyes: Conjunctivae and EOM are normal. Pupils are equal, round, and reactive to light.   Neck: Trachea normal. Neck supple.   Normal range of motion.  Cardiovascular:  Regular rhythm, normal heart sounds and intact distal pulses.           Pulses:       Radial pulses are 3+ on the right side and 3+ on the left side.        Dorsalis pedis pulses are 3+ on the right side and 3+ on the left side.   Pulmonary/Chest: Breath sounds normal. No respiratory distress.   Musculoskeletal:      Right shoulder: Normal.      Left shoulder: Normal.      Right upper arm: Normal.      Left upper arm: Normal.      Right elbow: Normal.      Left elbow: Normal.      Right forearm: Normal.      Left forearm: Normal.      Right wrist: Normal.      Left wrist: Normal.      Right hand: Normal.      Left hand: Normal.      Cervical back: Normal range of motion and neck supple.     Lymphadenopathy:     She has no cervical adenopathy.     She has no axillary adenopathy.   Neurological: She is alert and oriented to person, place, and time. She has normal strength. No cranial nerve deficit or sensory deficit. She displays a negative Romberg sign. GCS eye subscore is 4. GCS verbal subscore is 5. GCS motor subscore is 6.   Reflex Scores:       Bicep reflexes are 2+ on the right side and 2+ on the left side.       Patellar reflexes are 2+ on the right side and 2+ on the left side.  Skin: Skin is warm and dry. Capillary refill takes less than 2 seconds. There is erythema (6 CM AREA  LOWER LEG).    Psychiatric: She has a normal mood and affect. Her speech is normal. Cognition and memory are normal.         Medical Screening Exam   See Full Note    ED Course   Procedures  Labs Reviewed   COMPREHENSIVE METABOLIC PANEL - Abnormal; Notable for the following components:       Result Value    Chloride 108 (*)     Glucose 167 (*)     BUN 32 (*)     Creatinine 1.94 (*)     eGFR 25 (*)     All other components within normal limits   CBC WITH DIFFERENTIAL - Abnormal; Notable for the following components:    RBC 3.39 (*)     Hemoglobin 11.0 (*)     Hematocrit 34.5 (*)     .8 (*)     MCH 32.4 (*)     MCHC 31.9 (*)     Neutrophils % 72.9 (*)     Lymphocytes % 9.3 (*)     Lymphocytes, Absolute 0.62 (*)     Monocytes % 9.1 (*)     Eosinophils % 8.7 (*)     Eosinophils, Absolute 0.58 (*)     All other components within normal limits   MANUAL DIFFERENTIAL - Abnormal; Notable for the following components:    Segmented Neutrophils, Man % 74 (*)     Lymphocytes, Man % 14 (*)     Eosinophils, Man % 9 (*)     All other components within normal limits   CBC W/ AUTO DIFFERENTIAL    Narrative:     The following orders were created for panel order CBC Auto Differential.  Procedure                               Abnormality         Status                     ---------                               -----------         ------                     CBC with Differential[[id number]]       Abnormal            Final result               Manual Differential[[id number]]         Abnormal            Final result                 Please view results for these tests on the individual orders.          Imaging Results              US Lower Extremity Veins Right (Final result)  Result time 02/05/24 09:10:22      Final result by Kamlesh Valerio MD (02/05/24 09:10:22)                   Impression:      No evidence of deep venous thrombosis in the right lower extremity.      Electronically signed by: Kamlesh Valerio  Date:    02/05/2024  Time:    09:10                Narrative:    EXAMINATION:  US LOWER EXTREMITY VEINS RIGHT    CLINICAL HISTORY:  Edema, unspecified    TECHNIQUE:  Duplex and color flow Doppler evaluation with compression of the right lower extremity veins was performed.  Ultrasound images captured and stored.    COMPARISON:  None    FINDINGS:  Right thigh veins: The common femoral, femoral, popliteal, upper greater saphenous, and deep femoral veins are patent and free of thrombus. The veins are normally compressible and have normal phasic flow and augmentation response.    Right calf veins: The visualized calf veins are patent.                                    X-Rays:   Independently Interpreted Readings:   Other Readings:  New Results - Imaging    Updated   Order   02/05/24 0912  US Lower Extremity Veins Right  Performed: 02/05/24 0907  Final         Impression: No evidence of deep venous thrombosis in the right lower extremity. Electronically signed by: Kamlesh Valerio Date: 02/05/2024 Time: 09:10          Medications - No data to display  Medical Decision Making  PT HAS RLE EDEMA AND AN AREA OF ERYTHEMA NOTED THIS AM. PT DENIES PAIN OR ADDITIONAL SYMPTOMS. PT DENIES KNOWN FEVER, INSECT BITE, TRAUMA.     Past Medical History    Diagnosis Date Comments  Hypertension [I10]    Renal disorder [N28.9]    Hypokalemia [E87.6]    Seasonal allergies [J30.2]    Rheumatoid arthritis, unspecified [M06.9]    Vitamin D deficiency [E55.9]    Constipation [K59.00]    Hypercholesteremia [E78.00]    Urinary frequency [R35.0]    Cancer [C80.1]  breast          Amount and/or Complexity of Data Reviewed  Independent Historian: caregiver  Labs: ordered.     Details: Viewed and Other Results - Labs      Updated   Order   02/05/24 0939  CBC Auto Differential  Collected: 02/05/24 0913  Final result  Specimen: Blood         02/05/24 0939  CBC with Differential  Collected: 02/05/24 0913  Final result  Specimen: Blood      WBC 6.67 K/uL  RBC 3.39 Low  M/uL  Hemoglobin 11.0 Low   g/dL  Hematocrit 34.5 Low  %  .8 High  fL  MCH 32.4 High  pg  MCHC 31.9 Low  g/dL  RDW 14.3 %  Platelet Count 168 K/uL  MPV 10.4 fL  Neutrophils % 72.9 High  %  Lymphocytes % 9.3 Low  %  Neutrophils, Abs 4.86 K/uL  Lymphocytes, Absolute 0.62 Low  K/uL  Diff Type Manual  Monocytes % 9.1 High  %  Eosinophils % 8.7 High  %  Basophils % 0.0 %  Monocytes, Absolute 0.61 K/uL  Eosinophils, Absolute 0.58 High  K/uL  Basophils, Absolute 0.00 K/uL       02/05/24 0939  Manual Differential  Collected: 02/05/24 0913  Final result  Specimen: Blood      Segmented Neutrophils, Man % 74 High  %  Lymphocytes, Man % 14 Low  %  Monocytes, Man % 3 %  Eosinophils, Man % 9 High  %  nRBC, Manual -  Platelet Morphology Normal  Ovalocytes Few  Hypochromic 1+       02/05/24 0932  Comprehensive metabolic panel  Collected: 02/05/24 0913  Final result  Specimen: Blood      Sodium 143 mmol/L  Potassium 3.8 mmol/L  Chloride 108 High  mmol/L  CO2 27 mmol/L  Anion Gap 12 mmol/L  Glucose 167 High  mg/dL  BUN 32 High  mg/dL  Creatinine 1.94 High  mg/dL  BUN/Creatinine Ratio 16  Calcium 8.6 mg/dL  Total Protein 7.1 g/dL  Albumin 3.6 g/dL  Globulin 3.5 g/dL  A/G Ratio 1.0  Bilirubin, Total 0.5 mg/dL  Alk Phos 88 U/L  ALT 19 U/L  AST 15 U/L  eGFR 25 Low  mL/min/1.73m2            Radiology: ordered.     Details: New Results - Imaging    Updated   Order   02/05/24 0912  US Lower Extremity Veins Right  Performed: 02/05/24 0907  Final         Impression: No evidence of deep venous thrombosis in the right lower extremity. Electronically signed by: Kamlesh Valerio Date: 02/05/2024 Time: 09:10        Discussion of management or test interpretation with external provider(s): EXAM  US NEG FOR DVT  CBC WBC 6 K AND BUN/CREAT 32/1.94,   DC HOME ON ABX  URINE CULTURE LAST WEEK NG ADVISED TO STOP MACROBID  RX BACTRIM    Risk  Prescription drug management.                                      Clinical Impression:   Final diagnoses:  [R60.9] Edema  (Primary)  [L03.115] Cellulitis of right lower extremity        ED Disposition Condition    Discharge Stable          ED Prescriptions       Medication Sig Dispense Start Date End Date Auth. Provider    sulfamethoxazole-trimethoprim 800-160mg (BACTRIM DS) 800-160 mg Tab () Take 1 tablet by mouth 2 (two) times daily. for 7 days 14 tablet 2024 Brandie Santana MD    mupirocin (BACTROBAN) 2 % ointment Apply topically 3 (three) times daily. 15 g 2024 -- Brandie Santana MD          Follow-up Information       Follow up With Specialties Details Why Contact Info    Becki Edwards, [facility]  IN [place], [place] [address] [facility] [place] [address]  [phone]               Brandie Santana MD  [id number]

## 2024-02-05 NOTE — DISCHARGE INSTRUCTIONS
INCREASE REST AND FLUIDS   MEDICATION AS TOLERATED  STOP MACRODANTIN  OVER THE COUNTER TYLENOL AS NEEDED FOR PAIN

## 2024-02-29 ENCOUNTER — OFFICE VISIT (OUTPATIENT)
Dept: FAMILY MEDICINE | Facility: CLINIC | Age: 89
End: 2024-02-29
Payer: MEDICARE

## 2024-02-29 VITALS
BODY MASS INDEX: 27.66 KG/M2 | WEIGHT: 162 LBS | SYSTOLIC BLOOD PRESSURE: 110 MMHG | OXYGEN SATURATION: 99 % | RESPIRATION RATE: 18 BRPM | HEIGHT: 64 IN | HEART RATE: 77 BPM | DIASTOLIC BLOOD PRESSURE: 74 MMHG | TEMPERATURE: 98 F

## 2024-02-29 DIAGNOSIS — N39.0 ACUTE UTI: Primary | ICD-10-CM

## 2024-02-29 PROCEDURE — 1159F MED LIST DOCD IN RCRD: CPT | Mod: ,,, | Performed by: FAMILY MEDICINE

## 2024-02-29 PROCEDURE — 1125F AMNT PAIN NOTED PAIN PRSNT: CPT | Mod: ,,, | Performed by: FAMILY MEDICINE

## 2024-02-29 PROCEDURE — 99213 OFFICE O/P EST LOW 20 MIN: CPT | Mod: ,,, | Performed by: FAMILY MEDICINE

## 2024-02-29 PROCEDURE — 1101F PT FALLS ASSESS-DOCD LE1/YR: CPT | Mod: ,,, | Performed by: FAMILY MEDICINE

## 2024-02-29 PROCEDURE — 3288F FALL RISK ASSESSMENT DOCD: CPT | Mod: ,,, | Performed by: FAMILY MEDICINE

## 2024-02-29 RX ORDER — SULFAMETHOXAZOLE AND TRIMETHOPRIM 800; 160 MG/1; MG/1
1 TABLET ORAL 2 TIMES DAILY
Qty: 20 TABLET | Refills: 0 | Status: SHIPPED | OUTPATIENT
Start: 2024-02-29 | End: 2024-03-10

## 2024-02-29 RX ORDER — FLUCONAZOLE 150 MG/1
TABLET ORAL
Qty: 3 TABLET | Refills: 0 | Status: SHIPPED | OUTPATIENT
Start: 2024-02-29

## 2024-02-29 NOTE — PROGRESS NOTES
Clinic Note    Patient Name: Aviva Gonzalez  : 1935  MRN: [id number]    Chief Complaint   Patient presents with    Abdominal Pain     X 2 weeks        HPI:    Ms. Aviva Gonzalez is a 88 y.o. female who presents to clinic today with CC of lower abdominal pain, dysuria, and urinary frequency X 2 weeks.   Patient denies fever.  Patient is accompanied to this visit by a family member. Patient does not believe she can leave a urine sample in clinic today.  Patient is, otherwise, without complaints.     Medications:  Medication List with Changes/Refills   New Medications    FLUCONAZOLE (DIFLUCAN) 150 MG TAB    Take one tablet by mouth every 72 hours X 3 doses if needed for yeast infection after completion of antibiotics.    SULFAMETHOXAZOLE-TRIMETHOPRIM 800-160MG (BACTRIM DS) 800-160 MG TAB    Take 1 tablet by mouth 2 (two) times daily. for 10 days   Current Medications    ADVAIR DISKUS 250-50 MCG/DOSE DISKUS INHALER    Inhale 1 puff into the lungs 2 (two) times daily.    CETIRIZINE (ZYRTEC) 10 MG TABLET    Take 1 tablet (10 mg total) by mouth once daily.    CHOLECALCIFEROL, VITAMIN D3, (VITAMIN D3) 10 MCG (400 UNIT) TAB    Take 1 tablet (400 Units total) by mouth once daily.    DICLOFENAC SODIUM (VOLTAREN) 1 % GEL    Apply topically.    DOCUSATE SODIUM (COLACE) 100 MG CAPSULE    Take 100 mg by mouth 2 (two) times daily as needed for Constipation.    FERROUS SULFATE 325 (65 FE) MG EC TABLET    Take 325 mg by mouth 2 (two) times daily.    FLUTICASONE PROPIONATE (FLONASE) 50 MCG/ACTUATION NASAL SPRAY    by Each Nostril route.    FUROSEMIDE (LASIX) 20 MG TABLET    Take 20 mg by mouth 2 (two) times a day.    LOVASTATIN (MEVACOR) 20 MG TABLET    Take 1 tablet (20 mg total) by mouth every evening.    METOPROLOL SUCCINATE (TOPROL-XL) 25 MG 24 HR TABLET    Take 1 tablet (25 mg total) by mouth once daily.    MUPIROCIN (BACTROBAN) 2 % OINTMENT    Apply topically 3 (three) times daily.    OXYBUTYNIN (DITROPAN) 5 MG TAB    Take 5 mg  by mouth 2 (two) times a day.    PANTOPRAZOLE (PROTONIX) 40 MG TABLET    Take 1 tablet (40 mg total) by mouth 2 (two) times daily. for 14 days    POTASSIUM CHLORIDE SA (K-DUR,KLOR-CON) 10 MEQ TABLET    Take 1 tablet (10 mEq total) by mouth 2 (two) times daily.        Allergies: Penicillins      Past Medical History:    Past Medical History:   Diagnosis Date    Cancer     breast    Constipation     Hypercholesteremia     Hypertension     Hypokalemia     Renal disorder     Rheumatoid arthritis, unspecified     Seasonal allergies     Urinary frequency     Vitamin D deficiency        Past Surgical History:    Past Surgical History:   Procedure Laterality Date    BREAST SURGERY Left          Social History:    Social History     Tobacco Use   Smoking Status Never   Smokeless Tobacco Current    Types: Chew     Social History     Substance and Sexual Activity   Alcohol Use Never     Social History     Substance and Sexual Activity   Drug Use Never         Family History:    Family History   Problem Relation Age of Onset    Hypertension Mother     Diabetes Mother     Breast cancer Daughter        Review of Systems:    Review of Systems   Constitutional:  Negative for appetite change, chills, fatigue, fever and unexpected weight change.   Eyes:  Negative for visual disturbance.   Respiratory:  Negative for cough and shortness of breath.    Cardiovascular:  Negative for chest pain and leg swelling.   Gastrointestinal:  Positive for abdominal pain. Negative for change in bowel habit, constipation, diarrhea, nausea and vomiting.   Genitourinary:  Positive for dysuria and frequency.   Musculoskeletal:  Negative for arthralgias.   Integumentary:  Negative for rash.   Neurological:  Negative for dizziness and headaches.   Psychiatric/Behavioral:  The patient is not nervous/anxious.         Vitals:    Vitals:    02/29/24 1448   BP: 110/74   BP Location: Left arm   Patient Position: Sitting   BP Method: Large (Automatic)   Pulse: 77  "  Resp: 18   Temp: 97.8 °F (36.6 °C)   TempSrc: Oral   SpO2: 99%   Weight: 73.5 kg (162 lb)   Height: 5' 4" (1.626 m)       Body mass index is 27.81 kg/m².    Wt Readings from Last 3 Encounters:   02/29/24 1448 73.5 kg (162 lb)   02/05/24 0807 72.6 kg (160 lb)   10/19/23 1017 78 kg (172 lb)        Physical Exam:    Physical Exam  Constitutional:       General: She is not in acute distress.     Appearance: Normal appearance.   HENT:      Nose: Nose normal.      Mouth/Throat:      Mouth: Mucous membranes are moist.      Pharynx: Oropharynx is clear.   Eyes:      Conjunctiva/sclera: Conjunctivae normal.   Cardiovascular:      Rate and Rhythm: Normal rate and regular rhythm.      Heart sounds: Normal heart sounds. No murmur heard.  Pulmonary:      Effort: Pulmonary effort is normal. No respiratory distress.      Breath sounds: Normal breath sounds. No wheezing, rhonchi or rales.   Abdominal:      General: Bowel sounds are normal.      Palpations: Abdomen is soft.      Tenderness: There is no abdominal tenderness. There is no right CVA tenderness, left CVA tenderness, guarding or rebound.   Musculoskeletal:      Cervical back: Neck supple.      Right lower leg: No edema.      Left lower leg: No edema.   Skin:     Findings: No rash.   Neurological:      General: No focal deficit present.      Mental Status: She is alert. Mental status is at baseline.   Psychiatric:         Mood and Affect: Mood normal.       Assessment/Plan:   1. Acute UTI  -     Urinalysis; Future; Expected date: 02/29/2024  -     Urine Culture High Risk; Future; Expected date: 02/29/2024  -     sulfamethoxazole-trimethoprim 800-160mg (BACTRIM DS) 800-160 mg Tab; Take 1 tablet by mouth 2 (two) times daily. for 10 days  Dispense: 20 tablet; Refill: 0  -     fluconazole (DIFLUCAN) 150 MG Tab; Take one tablet by mouth every 72 hours X 3 doses if needed for yeast infection after completion of antibiotics.  Dispense: 3 tablet; Refill: 0  - Patient unable to " provide urine sample in clinic. Prior urine culture results reviewed. Patient home with urine specimen cup. Encouraged to return sample to lab. Voiced understanding.          Active Problem List with Overview Notes    Diagnosis Date Noted    Esophageal dysphagia 05/23/2023    Seasonal allergies 07/20/2022    Vitamin D deficiency 07/20/2022    Hyperlipidemia 07/20/2022    Hypertension 07/20/2022    Hypokalemia 07/20/2022          RTC prn if symptoms worsen or fail to resolve.  Patient voiced understanding and is agreeable to plan.      Gregoria Winter MD    Family Medicine

## 2024-03-04 LAB
BACTERIA #/AREA URNS HPF: ABNORMAL /HPF
BILIRUB UR QL STRIP: NEGATIVE
CLARITY UR: ABNORMAL
COLOR UR: ABNORMAL
GLUCOSE UR STRIP-MCNC: 30 MG/DL
KETONES UR STRIP-SCNC: NEGATIVE MG/DL
LEUKOCYTE ESTERASE UR QL STRIP: ABNORMAL
NITRITE UR QL STRIP: NEGATIVE
PH UR STRIP: 8.5 PH UNITS
PROT UR QL STRIP: >=300
RBC # UR STRIP: ABNORMAL /UL
RBC #/AREA URNS HPF: >182 /HPF
SP GR UR STRIP: 1.02
UROBILINOGEN UR STRIP-ACNC: NORMAL MG/DL
WBC #/AREA URNS HPF: >182 /HPF

## 2024-03-04 PROCEDURE — 81001 URINALYSIS AUTO W/SCOPE: CPT | Mod: ,,, | Performed by: CLINICAL MEDICAL LABORATORY

## 2024-03-04 PROCEDURE — 87086 URINE CULTURE/COLONY COUNT: CPT | Mod: ,,, | Performed by: CLINICAL MEDICAL LABORATORY

## 2024-03-06 LAB — UA COMPLETE W REFLEX CULTURE PNL UR: NORMAL

## 2024-03-07 ENCOUNTER — TELEPHONE (OUTPATIENT)
Dept: FAMILY MEDICINE | Facility: CLINIC | Age: 89
End: 2024-03-07
Payer: MEDICARE

## 2024-03-07 NOTE — TELEPHONE ENCOUNTER
----- Message from Chloé Winter MD sent at 3/6/2024 12:30 PM CST -----  Please call patient regarding urine culture results. Patient's urine culture did not grow out any bacteria. However, she blood in her urine. Does she have a h/o renal stones? If no, recommend CT abdomen and pelvis without contrast (stone protocol) to try to find source of hematuria. Thanks!      [id number]- call made to pt. No answer. Left vm for pt to call back.

## 2024-03-09 DIAGNOSIS — Z71.89 COMPLEX CARE COORDINATION: ICD-10-CM

## 2024-05-16 ENCOUNTER — LAB REQUISITION (OUTPATIENT)
Dept: LAB | Facility: HOSPITAL | Age: 89
End: 2024-05-16
Payer: MEDICARE

## 2024-05-16 DIAGNOSIS — R60.9 EDEMA, UNSPECIFIED: ICD-10-CM

## 2024-05-16 LAB
ALBUMIN SERPL BCP-MCNC: 3.3 G/DL (ref 3.5–5)
ALBUMIN/GLOB SERPL: 1.1 {RATIO}
ALP SERPL-CCNC: 109 U/L (ref 55–142)
ALT SERPL W P-5'-P-CCNC: 23 U/L (ref 13–56)
ANION GAP SERPL CALCULATED.3IONS-SCNC: 14 MMOL/L (ref 7–16)
AST SERPL W P-5'-P-CCNC: 24 U/L (ref 15–37)
BILIRUB SERPL-MCNC: 0.4 MG/DL (ref ?–1.2)
BUN SERPL-MCNC: 30 MG/DL (ref 7–18)
BUN/CREAT SERPL: 19 (ref 6–20)
CALCIUM SERPL-MCNC: 8.6 MG/DL (ref 8.5–10.1)
CHLORIDE SERPL-SCNC: 109 MMOL/L (ref 98–107)
CO2 SERPL-SCNC: 28 MMOL/L (ref 21–32)
CREAT SERPL-MCNC: 1.59 MG/DL (ref 0.55–1.02)
EGFR (NO RACE VARIABLE) (RUSH/TITUS): 31 ML/MIN/1.73M2
GLOBULIN SER-MCNC: 3.1 G/DL (ref 2–4)
GLUCOSE SERPL-MCNC: 132 MG/DL (ref 74–106)
POTASSIUM SERPL-SCNC: 4.3 MMOL/L (ref 3.5–5.1)
PROT SERPL-MCNC: 6.4 G/DL (ref 6.4–8.2)
SODIUM SERPL-SCNC: 147 MMOL/L (ref 136–145)

## 2024-05-16 PROCEDURE — 80053 COMPREHEN METABOLIC PANEL: CPT

## 2024-08-15 ENCOUNTER — HOSPITAL ENCOUNTER (EMERGENCY)
Facility: HOSPITAL | Age: 89
Discharge: HOME | End: 2024-08-15
Payer: MEDICARE

## 2024-08-15 VITALS
HEIGHT: 64 IN | SYSTOLIC BLOOD PRESSURE: 114 MMHG | WEIGHT: 160 LBS | OXYGEN SATURATION: 100 % | RESPIRATION RATE: 19 BRPM | DIASTOLIC BLOOD PRESSURE: 66 MMHG | TEMPERATURE: 98 F | HEART RATE: 70 BPM | BODY MASS INDEX: 27.31 KG/M2

## 2024-08-15 DIAGNOSIS — R10.9 ABDOMINAL PAIN: ICD-10-CM

## 2024-08-15 DIAGNOSIS — R51.9 ACUTE NONINTRACTABLE HEADACHE, UNSPECIFIED HEADACHE TYPE: ICD-10-CM

## 2024-08-15 DIAGNOSIS — J06.9 UPPER RESPIRATORY TRACT INFECTION, UNSPECIFIED TYPE: ICD-10-CM

## 2024-08-15 DIAGNOSIS — N30.00 ACUTE CYSTITIS WITHOUT HEMATURIA: Primary | ICD-10-CM

## 2024-08-15 LAB
BACTERIA #/AREA URNS HPF: ABNORMAL /HPF
BILIRUB UR QL STRIP: ABNORMAL
CLARITY UR: ABNORMAL
COLOR UR: YELLOW
GLUCOSE UR STRIP-MCNC: NEGATIVE MG/DL
GROUP A STREP MOLECULAR (OHS): NEGATIVE
INFLUENZA A MOLECULAR (OHS): NEGATIVE
INFLUENZA B MOLECULAR (OHS): NEGATIVE
KETONES UR STRIP-SCNC: ABNORMAL MG/DL
LEUKOCYTE ESTERASE UR QL STRIP: ABNORMAL
NITRITE UR QL STRIP: NEGATIVE
PH UR STRIP: 7 PH UNITS
PROT UR QL STRIP: >=300
RBC # UR STRIP: ABNORMAL /UL
RBC #/AREA URNS HPF: ABNORMAL /HPF
SARS-COV-2 RDRP RESP QL NAA+PROBE: NEGATIVE
SP GR UR STRIP: 1.02
SQUAMOUS #/AREA URNS LPF: ABNORMAL /LPF
UROBILINOGEN UR STRIP-ACNC: 0.2 MG/DL
WBC #/AREA URNS HPF: ABNORMAL /HPF

## 2024-08-15 PROCEDURE — 99284 EMERGENCY DEPT VISIT MOD MDM: CPT | Mod: ,,, | Performed by: NURSE PRACTITIONER

## 2024-08-15 PROCEDURE — 87502 INFLUENZA DNA AMP PROBE: CPT | Performed by: NURSE PRACTITIONER

## 2024-08-15 PROCEDURE — 99283 EMERGENCY DEPT VISIT LOW MDM: CPT | Mod: 25

## 2024-08-15 PROCEDURE — 87086 URINE CULTURE/COLONY COUNT: CPT | Performed by: NURSE PRACTITIONER

## 2024-08-15 PROCEDURE — 87651 STREP A DNA AMP PROBE: CPT | Performed by: NURSE PRACTITIONER

## 2024-08-15 PROCEDURE — 87635 SARS-COV-2 COVID-19 AMP PRB: CPT | Performed by: NURSE PRACTITIONER

## 2024-08-15 PROCEDURE — 81003 URINALYSIS AUTO W/O SCOPE: CPT | Performed by: NURSE PRACTITIONER

## 2024-08-15 RX ORDER — TETRACYCLINE HYDROCHLORIDE 500 MG/1
CAPSULE ORAL
COMMUNITY

## 2024-08-15 RX ORDER — NITROFURANTOIN 25; 75 MG/1; MG/1
100 CAPSULE ORAL 2 TIMES DAILY
Qty: 10 CAPSULE | Refills: 0 | Status: SHIPPED | OUTPATIENT
Start: 2024-08-15 | End: 2024-08-20

## 2024-08-15 RX ORDER — OLOPATADINE HYDROCHLORIDE 2 MG/ML
SOLUTION/ DROPS OPHTHALMIC
COMMUNITY
Start: 2024-08-14

## 2024-08-15 RX ORDER — PANTOPRAZOLE SODIUM 40 MG/1
TABLET, DELAYED RELEASE ORAL
COMMUNITY

## 2024-08-15 NOTE — ED PROVIDER NOTES
Encounter Date: 8/15/2024       History     Chief Complaint   Patient presents with    Headache    Abdominal Pain     Onset 30 mins PTA     Aviva Gonzalez is a 88 y.o. Black or  /female presenting to ED with congestion, cough, headache and diffuse abd pain intermittent for 8 hours. States that she took tylenol 30 min PTA and pain has subsided. Hx of similar intermittent abd pain which she was evaluated for by PCP yesterday and prescribed Protonix. Patient is unsure if she is taking medication. Last BM this AM. Denies N/V, fever, chills. Currently has no complaints and in NAD. VSS at this time.      The history is provided by the patient.     Review of patient's allergies indicates:   Allergen Reactions    Penicillins Rash     Past Medical History:   Diagnosis Date    Cancer     breast    Constipation     Hypercholesteremia     Hypertension     Hypokalemia     Renal disorder     Rheumatoid arthritis, unspecified     Seasonal allergies     Urinary frequency     Vitamin D deficiency      Past Surgical History:   Procedure Laterality Date    BREAST SURGERY Left      Family History   Problem Relation Name Age of Onset    Hypertension Mother      Diabetes Mother      Breast cancer Daughter       Social History     Tobacco Use    Smoking status: Never     Passive exposure: Never    Smokeless tobacco: Current     Types: Chew   Substance Use Topics    Alcohol use: Never    Drug use: Never     Review of Systems   Constitutional:  Negative for appetite change, chills, fatigue and fever.   HENT:  Positive for congestion and rhinorrhea. Negative for ear pain and sore throat.    Respiratory:  Positive for cough. Negative for shortness of breath.    Cardiovascular:  Negative for chest pain.   Gastrointestinal:  Positive for abdominal pain. Negative for abdominal distention, constipation, diarrhea, nausea and vomiting.   Genitourinary:  Negative for dysuria, frequency and urgency.   All other systems reviewed and are  negative.      Physical Exam     Initial Vitals [08/15/24 1654]   BP Pulse Resp Temp SpO2   128/74 77 19 97.8 °F (36.6 °C) 100 %      MAP       --         Physical Exam    Nursing note and vitals reviewed.  Constitutional: She appears well-developed and well-nourished. She is not diaphoretic. No distress.   HENT:   Head: Normocephalic.   Right Ear: Hearing, tympanic membrane, external ear and ear canal normal.   Left Ear: Hearing, tympanic membrane, external ear and ear canal normal.   Nose: Mucosal edema and rhinorrhea present. Right sinus exhibits no maxillary sinus tenderness and no frontal sinus tenderness. Left sinus exhibits no maxillary sinus tenderness and no frontal sinus tenderness.   Mouth/Throat: Uvula is midline, oropharynx is clear and moist and mucous membranes are normal. No oropharyngeal exudate, posterior oropharyngeal edema or posterior oropharyngeal erythema.   Eyes: Conjunctivae are normal. Pupils are equal, round, and reactive to light. Right eye exhibits no discharge. Left eye exhibits no discharge. No scleral icterus.   Neck: Neck supple.   Normal range of motion.   Full passive range of motion without pain.     Cardiovascular:  Normal rate, regular rhythm and normal heart sounds.           Pulmonary/Chest: Breath sounds normal. No respiratory distress. She has no wheezes. She exhibits no tenderness.   Abdominal: Abdomen is soft. Bowel sounds are normal. She exhibits no distension. There is abdominal tenderness in the suprapubic area. No hernia.   No right CVA tenderness.  No left CVA tenderness. There is no rebound, no guarding, no tenderness at McBurney's point and negative Sampson's sign.   Musculoskeletal:         General: Normal range of motion.      Cervical back: Full passive range of motion without pain, normal range of motion and neck supple.     Lymphadenopathy:     She has no cervical adenopathy.   Neurological: She is alert and oriented to person, place, and time. GCS score is 15.  GCS eye subscore is 4. GCS verbal subscore is 5. GCS motor subscore is 6.   Skin: Skin is warm and dry. Capillary refill takes less than 2 seconds.         Medical Screening Exam   See Full Note    ED Course   Procedures  Labs Reviewed   URINALYSIS, REFLEX TO URINE CULTURE - Abnormal       Result Value    Color, UA Yellow      Clarity, UA Cloudy      pH, UA 7.0      Leukocytes, UA Large (*)     Nitrites, UA Negative      Protein, UA >=300 (*)     Glucose, UA Negative      Ketones, UA Trace      Urobilinogen, UA 0.2      Bilirubin, UA Small (*)     Blood, UA Moderate (*)     Specific [place], UA 1.025     URINALYSIS, MICROSCOPIC - Abnormal    WBC, UA Too Numerous To Count (*)     RBC, UA Too Numerous To Count (*)     Bacteria, UA Loaded (*)     Squamous Epithelial Cells, UA Moderate (*)    INFLUENZA A & B BY MOLECULAR - Normal    INFLUENZA A MOLECULAR Negative      INFLUENZA B MOLECULAR  Negative     SARS-COV-2 RNA AMPLIFICATION, QUAL - Normal    SARS COV-2 Molecular Negative      Narrative:     Negative SARS-CoV results should not be used as the sole basis for treatment or patient management decisions; negative results should be considered in the context of a patient's recent exposures, history and the presene of clinical signs and symptoms consistent with COVID-19.  Negative results should be treated as presumptive and confirmed by molecular assay, if necessary for patient management.   STREP A BY MOLECULAR METHOD - Normal    Group A Strep Molecular Negative     CULTURE, URINE          Imaging Results              X-Ray Abdomen AP 1 View (Final result)  Result time 08/15/24 17:59:56   Procedure changed from X-Ray Abdomen Flat And Erect     Final result by Nolan Martinez MD (08/15/24 17:59:56)                   Impression:      No definite acute abdominal process      Electronically signed by: Nolan Martinez  Date:    08/15/2024  Time:    17:59               Narrative:    EXAMINATION:  XR ABDOMEN AP 1  VIEW    CLINICAL HISTORY:  Abdominal pain, acute, nonlocalized;abdominal pain;.  Unspecified abdominal pain    COMPARISON:  No previous similar    TECHNIQUE:  AP supine abdomen    FINDINGS:  The bowel gas pattern is nonobstructive without gross mass lesion.  There is no radiopaque calculus.  Phleboliths overlie the pelvis.  Osteopenia.                                       Medications - No data to display  Medical Decision Making  Given the large differential diagnosis for Aviva Gonzalez, the decision making in this case is of high complexity.  After evaluating all of the data points in this case, the presentation of Aviva Gonzalez is NOT consistent with AAA; Mesenteric Ischemia; Bowel Perforation; Bowel Obstruction; Sigmoid Volvulus; Diverticulitis; Appendicitis; Peritonitis; Cholecystitis, ascending cholangitis or other gallbladder disease; perforated ulcer; significant GI bleeding, splenic rupture/infarction; Hepatic abscess; or other surgical/acute abdomen.  Similarly, this presentation is NOT consistent with ACS or Myocardial Ischemia or cardiac etiology; Pulmonary Embolism; fistula; incarcerated hernia; Pancreatitis, Aortic Dissection; Diabetic Ketoacidosis; Kidney Stone; Ischemic colitis; Psoas or other abscess; Methanol poisoning; Heavy metal toxicity; or porphyria.  Similarly, this case is NOT consistent with PID, Tubo-ovarian abscess, Ovarian Torsion, or STI.  Similarly, this presentation is NOT consistent with acute coronary syndrome, pulmonary embolism, dissection, borhaave's, arrythmia, pneumothorax, cardiac tamponade, or other emergent cardiopulmonary condition.  Similarly, this presentation is NOT consistent with sepsis, pyelonephritis, pneumonia.  Denies abd pain or headache while in ED. Took Tylenol 30 min PTA.   D/c script for Macrobid. Suggested Miralax for constipation. Strict return and follow-up precautions have been given by me personally to the patient/family/caregiver(s).    Data  Reviewed/Counseling: I have reviewed the patient's vital signs, nursing notes, and other relevant tests/information. I had a detailed discussion regarding the historical points, exam findings, and any diagnostic results supporting the discharge diagnosis. I also discussed the need for outpatient follow-up and the need to return to the ED if symptoms worsen or if there are any questions or concerns that arise at home.     Dx: Acute cystitis, non-intractable headache, URI    Amount and/or Complexity of Data Reviewed  Independent Historian:      Details: Aviva Gonzalez is a 88 y.o. Black or  /female presenting to ED with congestion, cough, headache and diffuse abd pain intermittent for 8 hours. States that she took tylenol 30 min PTA and pain has subsided. Hx of similar intermittent abd pain which she was evaluated for by PCP yesterday and prescribed Protonix. Patient is unsure if she is taking medication. Last BM this AM. Denies N/V, fever, chills. Currently has no complaints and in NAD. VSS at this time.    Labs: ordered.     Details: Urinalysis- TNTC RBC, TNTC WBC, Loaded bacteria  COVID/Flu/Strep- negative  Radiology: ordered.     Details: KUB- The bowel gas pattern is nonobstructive without gross mass lesion.  There is no radiopaque calculus.  Phleboliths overlie the pelvis.  Osteopenia.    Risk  Prescription drug management.                                      Clinical Impression:   Final diagnoses:  [R10.9] Abdominal pain  [N30.00] Acute cystitis without hematuria (Primary)  [R51.9] Acute nonintractable headache, unspecified headache type  [J06.9] Upper respiratory tract infection, unspecified type        ED Disposition Condition    Discharge Stable          ED Prescriptions       Medication Sig Dispense Start Date End Date Auth. Provider    nitrofurantoin, macrocrystal-monohydrate, (MACROBID) 100 MG capsule Take 1 capsule (100 mg total) by mouth 2 (two) times daily. for 5 days 10 capsule 8/15/2024  8/20/2024 Steffanie Portillo, ANTON          Follow-up Information    None          Steffanie Portillo, ANTON  08/15/24 [id number]

## 2024-08-15 NOTE — DISCHARGE INSTRUCTIONS
Follow up with primary care provider when antibiotics are complete.   Increase water intake.   Complete full course of antibiotics.   Tylenol 650 mg every 4 hours as needed for headache.  Over the counter medication as needed for upper respiratory symptoms. Only use medication with heart on box indicating safe use for those with heart conditions.  Increase fiber in diet. See handout.  Increase water intake.  Over the counter stool softener as directed if you notice routine hard stools, decreased frequency of bowel movements or constipation.   Return to emergency department for any new or worsening symptoms.    Return to emergency department for any new or worsening symptoms.

## 2024-08-18 LAB — UA COMPLETE W REFLEX CULTURE PNL UR: NORMAL

## 2024-10-09 DIAGNOSIS — Z71.89 COMPLEX CARE COORDINATION: ICD-10-CM

## 2024-10-11 ENCOUNTER — LAB REQUISITION (OUTPATIENT)
Dept: LAB | Facility: HOSPITAL | Age: 89
End: 2024-10-11
Payer: MEDICARE

## 2024-10-11 LAB
BACTERIA #/AREA URNS HPF: ABNORMAL /HPF
BILIRUB UR QL STRIP: NEGATIVE
CLARITY UR: ABNORMAL
COLOR UR: YELLOW
GLUCOSE UR STRIP-MCNC: NEGATIVE MG/DL
KETONES UR STRIP-SCNC: NEGATIVE MG/DL
LEUKOCYTE ESTERASE UR QL STRIP: ABNORMAL
NITRITE UR QL STRIP: NEGATIVE
PH UR STRIP: >=9 PH UNITS
PROT UR QL STRIP: >=300
RBC # UR STRIP: ABNORMAL /UL
RBC #/AREA URNS HPF: ABNORMAL /HPF
SP GR UR STRIP: 1.01
SQUAMOUS #/AREA URNS LPF: ABNORMAL /LPF
UROBILINOGEN UR STRIP-ACNC: 0.2 MG/DL
WBC #/AREA URNS HPF: ABNORMAL /HPF

## 2024-10-11 PROCEDURE — 81003 URINALYSIS AUTO W/O SCOPE: CPT

## 2024-10-11 PROCEDURE — 87186 SC STD MICRODIL/AGAR DIL: CPT

## 2024-10-11 PROCEDURE — 87077 CULTURE AEROBIC IDENTIFY: CPT

## 2024-10-11 PROCEDURE — 87086 URINE CULTURE/COLONY COUNT: CPT

## 2024-10-11 PROCEDURE — 81001 URINALYSIS AUTO W/SCOPE: CPT

## 2024-10-16 ENCOUNTER — HOSPITAL ENCOUNTER (EMERGENCY)
Facility: HOSPITAL | Age: 89
Discharge: HOME | End: 2024-10-16
Attending: EMERGENCY MEDICINE
Payer: MEDICARE

## 2024-10-16 VITALS
WEIGHT: 160 LBS | DIASTOLIC BLOOD PRESSURE: 67 MMHG | HEART RATE: 78 BPM | HEIGHT: 64 IN | BODY MASS INDEX: 27.31 KG/M2 | RESPIRATION RATE: 20 BRPM | OXYGEN SATURATION: 99 % | SYSTOLIC BLOOD PRESSURE: 111 MMHG | TEMPERATURE: 98 F

## 2024-10-16 DIAGNOSIS — E87.6 HYPOKALEMIA: ICD-10-CM

## 2024-10-16 DIAGNOSIS — N30.00 ACUTE CYSTITIS WITHOUT HEMATURIA: Primary | ICD-10-CM

## 2024-10-16 DIAGNOSIS — E86.0 DEHYDRATION: ICD-10-CM

## 2024-10-16 DIAGNOSIS — R91.8 PULMONARY NODULES: ICD-10-CM

## 2024-10-16 DIAGNOSIS — K44.9 HIATAL HERNIA: ICD-10-CM

## 2024-10-16 DIAGNOSIS — R16.0 LIVER MASS, RIGHT LOBE: ICD-10-CM

## 2024-10-16 LAB
ALBUMIN SERPL BCP-MCNC: 2.3 G/DL (ref 3.5–5)
ALBUMIN/GLOB SERPL: 0.6 {RATIO}
ALP SERPL-CCNC: 160 U/L (ref 55–142)
ALT SERPL W P-5'-P-CCNC: 11 U/L (ref 13–56)
ANION GAP SERPL CALCULATED.3IONS-SCNC: 17 MMOL/L (ref 7–16)
AST SERPL W P-5'-P-CCNC: 20 U/L (ref 15–37)
BACTERIA #/AREA URNS HPF: ABNORMAL /HPF
BASOPHILS # BLD AUTO: 0.01 K/UL (ref 0–0.2)
BASOPHILS NFR BLD AUTO: 0.1 % (ref 0–1)
BILIRUB SERPL-MCNC: 0.5 MG/DL (ref ?–1.2)
BILIRUB UR QL STRIP: NEGATIVE
BUN SERPL-MCNC: 29 MG/DL (ref 7–18)
BUN/CREAT SERPL: 17 (ref 6–20)
CALCIUM SERPL-MCNC: 8.1 MG/DL (ref 8.5–10.1)
CHLORIDE SERPL-SCNC: 107 MMOL/L (ref 98–107)
CLARITY UR: ABNORMAL
CO2 SERPL-SCNC: 23 MMOL/L (ref 21–32)
COLOR UR: YELLOW
CREAT SERPL-MCNC: 1.74 MG/DL (ref 0.55–1.02)
DIFFERENTIAL METHOD BLD: ABNORMAL
EGFR (NO RACE VARIABLE) (RUSH/TITUS): 28 ML/MIN/1.73M2
EOSINOPHIL # BLD AUTO: 0.53 K/UL (ref 0–0.5)
EOSINOPHIL NFR BLD AUTO: 5.7 % (ref 1–4)
ERYTHROCYTE [DISTWIDTH] IN BLOOD BY AUTOMATED COUNT: 15.3 % (ref 11.5–14.5)
GLOBULIN SER-MCNC: 3.9 G/DL (ref 2–4)
GLUCOSE SERPL-MCNC: 149 MG/DL (ref 74–106)
GLUCOSE UR STRIP-MCNC: NEGATIVE MG/DL
HCT VFR BLD AUTO: 29.8 % (ref 38–47)
HGB BLD-MCNC: 9.5 G/DL (ref 12–16)
KETONES UR STRIP-SCNC: NEGATIVE MG/DL
LACTATE SERPL-SCNC: 2 MMOL/L (ref 0.4–2)
LEUKOCYTE ESTERASE UR QL STRIP: ABNORMAL
LYMPHOCYTES # BLD AUTO: 1.07 K/UL (ref 1–4.8)
LYMPHOCYTES NFR BLD AUTO: 11.5 % (ref 27–41)
MCH RBC QN AUTO: 32.2 PG (ref 27–31)
MCHC RBC AUTO-ENTMCNC: 31.9 G/DL (ref 32–36)
MCV RBC AUTO: 101 FL (ref 80–96)
MONOCYTES # BLD AUTO: 0.76 K/UL (ref 0–0.8)
MONOCYTES NFR BLD AUTO: 8.2 % (ref 2–6)
MPC BLD CALC-MCNC: 9.9 FL (ref 9.4–12.4)
NEUTROPHILS # BLD AUTO: 6.94 K/UL (ref 1.8–7.7)
NEUTROPHILS NFR BLD AUTO: 74.5 % (ref 53–65)
NITRITE UR QL STRIP: NEGATIVE
PH UR STRIP: 6.5 PH UNITS
PLATELET # BLD AUTO: 259 K/UL (ref 150–400)
POTASSIUM SERPL-SCNC: 3 MMOL/L (ref 3.5–5.1)
PROT SERPL-MCNC: 6.2 G/DL (ref 6.4–8.2)
PROT UR QL STRIP: 30
RBC # BLD AUTO: 2.95 M/UL (ref 4.2–5.4)
RBC # UR STRIP: ABNORMAL /UL
RBC #/AREA URNS HPF: ABNORMAL /HPF
SODIUM SERPL-SCNC: 144 MMOL/L (ref 136–145)
SP GR UR STRIP: 1.01
SQUAMOUS #/AREA URNS LPF: ABNORMAL /LPF
UA COMPLETE W REFLEX CULTURE PNL UR: ABNORMAL
UA COMPLETE W REFLEX CULTURE PNL UR: ABNORMAL
UROBILINOGEN UR STRIP-ACNC: 0.2 MG/DL
WBC # BLD AUTO: 9.31 K/UL (ref 4.5–11)
WBC #/AREA URNS HPF: ABNORMAL /HPF

## 2024-10-16 PROCEDURE — 87186 SC STD MICRODIL/AGAR DIL: CPT | Performed by: EMERGENCY MEDICINE

## 2024-10-16 PROCEDURE — 25000003 PHARM REV CODE 250

## 2024-10-16 PROCEDURE — 81003 URINALYSIS AUTO W/O SCOPE: CPT | Performed by: EMERGENCY MEDICINE

## 2024-10-16 PROCEDURE — 81001 URINALYSIS AUTO W/SCOPE: CPT | Performed by: EMERGENCY MEDICINE

## 2024-10-16 PROCEDURE — 99285 EMERGENCY DEPT VISIT HI MDM: CPT | Mod: 25

## 2024-10-16 PROCEDURE — 87040 BLOOD CULTURE FOR BACTERIA: CPT | Performed by: EMERGENCY MEDICINE

## 2024-10-16 PROCEDURE — 99285 EMERGENCY DEPT VISIT HI MDM: CPT | Mod: ,,, | Performed by: EMERGENCY MEDICINE

## 2024-10-16 PROCEDURE — 80053 COMPREHEN METABOLIC PANEL: CPT | Performed by: EMERGENCY MEDICINE

## 2024-10-16 PROCEDURE — 36415 COLL VENOUS BLD VENIPUNCTURE: CPT | Performed by: EMERGENCY MEDICINE

## 2024-10-16 PROCEDURE — 83605 ASSAY OF LACTIC ACID: CPT | Performed by: EMERGENCY MEDICINE

## 2024-10-16 PROCEDURE — 85025 COMPLETE CBC W/AUTO DIFF WBC: CPT | Performed by: EMERGENCY MEDICINE

## 2024-10-16 PROCEDURE — 87077 CULTURE AEROBIC IDENTIFY: CPT | Performed by: EMERGENCY MEDICINE

## 2024-10-16 RX ORDER — POTASSIUM CHLORIDE 20 MEQ/1
20 TABLET, EXTENDED RELEASE ORAL DAILY
Qty: 3 TABLET | Refills: 0 | Status: SHIPPED | OUTPATIENT
Start: 2024-10-16 | End: 2024-10-19

## 2024-10-16 RX ORDER — CIPROFLOXACIN 250 MG/1
250 TABLET, FILM COATED ORAL
Status: DISCONTINUED | OUTPATIENT
Start: 2024-10-16 | End: 2024-10-16

## 2024-10-16 RX ORDER — CIPROFLOXACIN 500 MG/1
500 TABLET ORAL
Status: COMPLETED | OUTPATIENT
Start: 2024-10-16 | End: 2024-10-16

## 2024-10-16 RX ORDER — CIPROFLOXACIN 250 MG/1
250 TABLET, FILM COATED ORAL 2 TIMES DAILY
Qty: 14 TABLET | Refills: 0 | Status: SHIPPED | OUTPATIENT
Start: 2024-10-16 | End: 2024-10-23

## 2024-10-16 RX ADMIN — CIPROFLOXACIN 500 MG: 500 TABLET ORAL at 08:10

## 2024-10-16 RX ADMIN — POTASSIUM BICARBONATE 40 MEQ: 391 TABLET, EFFERVESCENT ORAL at 08:10

## 2024-10-16 NOTE — ED TRIAGE NOTES
Brought in by ems with c/o weakness and altered mental status. Per son was seen a few days ago at [facility] and they done a urine spec and had not gotten a call back yet but they were concerned that she mite have a uti.

## 2024-10-16 NOTE — ED PROVIDER NOTES
"Encounter Date: 10/16/2024       History     Chief Complaint   Patient presents with    Weakness    Altered Mental Status     PT IS AN 89 YR OLD BF WITH HX " UTI AND DEHYDRATION"  WITH TRANSPORT PER EMS FOR AMS AND ON EMS ARRIVAL PT DID FOLLOW COMMANDS  BUT UNABLE TO SPEAK DUE TO DEHYDRATION WITH DRY MM. PT HAS LOWER ABDOMINAL PAIN DESCRIBED AS " JUST HURTS", DYSURIA, URINARY FREQUENCY ONSET LAST WEEK WITH UA OBTAINED FRIDAY (4 D AGO)  PT HAD URINE TEST ON 10/11 WITH > 100,000 ENTEROBACTER AND >25,000 ENTEROCOCCUS, PT IS NOT ON ABX, PT LAST URINE CULTURES WERE NORMAL DYLAN 8/24 AND 3/24  GLU  PER EMS    PT HAS HAD DECREASED PO INTAKE FOR SEVERAL DAYS  PT DENIES FEVER, CHILLS, N/V/D, PALPITATIONS OR SYNCOPE    PT IS USUALLY UP IN WC  IN THE HOME. AND RESIDES ALONE AND HAS PERSONAL CARE AID 6 HOURS PER DAY AND A SON CHECKS ON PT  AND IS ATTENTIVE.  PT HAS CKD.      The history is provided by the patient, the EMS personnel and a relative.     Review of patient's allergies indicates:   Allergen Reactions    Penicillins Rash     Past Medical History:   Diagnosis Date    Cancer     breast    Constipation     Hypercholesteremia     Hypertension     Hypokalemia     Renal disorder     Rheumatoid arthritis, unspecified     Seasonal allergies     Urinary frequency     Vitamin D deficiency      Past Surgical History:   Procedure Laterality Date    BREAST SURGERY Left      Family History   Problem Relation Name Age of Onset    Hypertension Mother      Diabetes Mother      Breast cancer Daughter       Social History     Tobacco Use    Smoking status: Never     Passive exposure: Never    Smokeless tobacco: Current     Types: Chew   Substance Use Topics    Alcohol use: Never    Drug use: Never     Review of Systems   Constitutional:  Positive for activity change. Negative for fever.   HENT: Negative.  Negative for sore throat.    Eyes: Negative.    Respiratory: Negative.  Negative for shortness of breath.    Cardiovascular: " Negative.  Negative for chest pain.   Gastrointestinal:  Positive for abdominal pain (LOWER ABDOMEN). Negative for nausea.   Endocrine: Negative.    Genitourinary: Negative.  Negative for dysuria.   Musculoskeletal: Negative.  Negative for back pain.   Skin:  Negative for rash.   Allergic/Immunologic: Positive for immunocompromised state.   Neurological:  Positive for weakness.        PT FOLLOWS COMMANDS   Hematological:  Does not bruise/bleed easily.   Psychiatric/Behavioral:          PT IS ALERT AND ORIENTED   All other systems reviewed and are negative.      Physical Exam     Initial Vitals [10/16/24 1657]   BP Pulse Resp Temp SpO2   111/68 88 20 98.1 °F (36.7 °C) 99 %      MAP       --         Physical Exam    Nursing note and vitals reviewed.  Constitutional: She appears well-developed and well-nourished. She is cooperative.   MORBIDLY OBESE   HENT:   Head: Normocephalic and atraumatic.   Right Ear: External ear normal.   Left Ear: External ear normal.   Nose: Nose normal.   MM DRY   Eyes: Conjunctivae and EOM are normal. Pupils are equal, round, and reactive to light.   Neck: Trachea normal. Neck supple. No thyromegaly present. No tracheal deviation present. No JVD present.   Normal range of motion.  Cardiovascular:  Normal rate, regular rhythm and intact distal pulses.     Exam reveals no gallop and no friction rub.       Murmur heard.  Pulses:       Radial pulses are 3+ on the right side and 3+ on the left side.        Dorsalis pedis pulses are 3+ on the right side and 3+ on the left side.   Pulmonary/Chest: Breath sounds normal. No stridor. No respiratory distress. She has no wheezes. She has no rhonchi. She has no rales. She exhibits no tenderness.   Abdominal: Abdomen is soft. Bowel sounds are normal. She exhibits no distension. There is abdominal tenderness (LOWER ABDOMEN). There is no rebound.   Musculoskeletal:         General: No tenderness or edema. Normal range of motion.      Right shoulder:  Normal.      Left shoulder: Normal.      Right upper arm: Normal.      Left upper arm: Normal.      Right elbow: Normal.      Left elbow: Normal.      Right forearm: Normal.      Left forearm: Normal.      Right wrist: Normal.      Left wrist: Normal.      Right hand: Normal.      Left hand: Normal.      Cervical back: Normal range of motion and neck supple.     Lymphadenopathy:     She has no cervical adenopathy.     She has no axillary adenopathy.   Neurological: She is alert and oriented to person, place, and time. She has normal strength and normal reflexes. No cranial nerve deficit or sensory deficit. She displays a negative Romberg sign. GCS eye subscore is 4. GCS verbal subscore is 5. GCS motor subscore is 6.   Reflex Scores:       Bicep reflexes are 2+ on the right side and 2+ on the left side.       Patellar reflexes are 2+ on the right side and 2+ on the left side.  Skin: Skin is warm and dry. Capillary refill takes less than 2 seconds. No erythema.   Psychiatric: She has a normal mood and affect. Her speech is normal and behavior is normal. Thought content normal. Cognition and memory are normal.   PT FOLLOWS COMMANDS  ALERT  AND KNOWS NAME, AGE, PLACE, MONTH, BD         Medical Screening Exam   See Full Note    ED Course   Procedures  Labs Reviewed   COMPREHENSIVE METABOLIC PANEL - Abnormal       Result Value    Sodium 144      Potassium 3.0 (*)     Chloride 107      CO2 23      Anion Gap 17 (*)     Glucose 149 (*)     BUN 29 (*)     Creatinine 1.74 (*)     BUN/Creatinine Ratio 17      Calcium 8.1 (*)     Total Protein 6.2 (*)     Albumin 2.3 (*)     Globulin 3.9      A/G Ratio 0.6      Bilirubin, Total 0.5      Alk Phos 160 (*)     ALT 11 (*)     AST 20      eGFR 28 (*)    URINALYSIS - Abnormal    Color, UA Yellow      Clarity, UA Slightly Cloudy      pH, UA 6.5      Leukocytes, UA Large (*)     Nitrites, UA Negative      Protein, UA 30 (*)     Glucose, UA Negative      Ketones, UA Negative       Urobilinogen, UA 0.2      Bilirubin, UA Negative      Blood, UA Moderate (*)     Specific Gravity, UA 1.010     CBC WITH DIFFERENTIAL - Abnormal    WBC 9.31      RBC 2.95 (*)     Hemoglobin 9.5 (*)     Hematocrit 29.8 (*)     .0 (*)     MCH 32.2 (*)     MCHC 31.9 (*)     RDW 15.3 (*)     Platelet Count 259      MPV 9.9      Neutrophils % 74.5 (*)     Lymphocytes % 11.5 (*)     Neutrophils, Abs 6.94      Lymphocytes, Absolute 1.07      Diff Type Auto      Monocytes % 8.2 (*)     Eosinophils % 5.7 (*)     Basophils % 0.1      Monocytes, Absolute 0.76      Eosinophils, Absolute 0.53 (*)     Basophils, Absolute 0.01     URINALYSIS, MICROSCOPIC - Abnormal    WBC, UA 20-50 (*)     RBC, UA 15-25 (*)     Bacteria, UA Moderate (*)     Squamous Epithelial Cells, UA Few (*)    LACTIC ACID, PLASMA - Normal    Lactic Acid 2.0     CULTURE, BLOOD   CULTURE, BLOOD   CULTURE, URINE   CBC W/ AUTO DIFFERENTIAL    Narrative:     The following orders were created for panel order CBC Auto Differential.  Procedure                               Abnormality         Status                     ---------                               -----------         ------                     CBC with Differential[[id number]]       Abnormal            Final result                 Please view results for these tests on the individual orders.          Imaging Results              X-Ray Chest 1 View (Final result)  Result time 10/16/24 19:56:24      Final result by Dilip Beckwith MD (10/16/24 19:56:24)                   Impression:      No acute radiographic abnormality      Electronically signed by: Dilip Beckwith  Date:    10/16/2024  Time:    19:56               Narrative:    EXAMINATION:  XR CHEST 1 VIEW    CLINICAL HISTORY:  DEHYDRATION;    TECHNIQUE:  Single frontal view of the chest was performed.    COMPARISON:  04/21/2015    FINDINGS:  Postop changes of the left breast.  Mild tortuosity of the aorta.    The lungs are clear, with normal  appearance of pulmonary vasculature and no pleural effusion or pneumothorax.    The cardiac silhouette is normal in size. The hilar and mediastinal contours are unremarkable.    Bones are intact.    No significant change.                                        CT Abdomen Pelvis  Without Contrast (Final result)  Result time 10/16/24 19:22:45      Final result by Dilip Beckwith MD (10/16/24 19:22:45)                   Impression:      1. Urinary bladder wall thickening with inflammatory changes suggesting cystitis.  Follow-up recommended.  2. Urinary bladder diverticulum projecting superiorly from the apex also.  3. Ill-defined low-density mass in the right hepatic lobe of the liver abutting the gallbladder measuring approximately 7.4 x 6.4 cm.  The mass is suspicious for neoplasm.  Invasion/involvement of the gallbladder is a possibility.  See above comments also.  Recommend surgical consultation and follow-up.  4. 2.4 cm soft tissue mass in the mesentery near the gallbladder and pancreatic head could represent adenopathy and metastatic disease.  Follow-up recommended.  5. Few nonspecific small subcentimeter bibasilar pulmonary nodules.  6. Small hiatal hernia.  7. This report was flagged in Epic as abnormal.      Electronically signed by: Dilip Beckwith  Date:    10/16/2024  Time:    19:22               Narrative:    EXAMINATION:  CT ABDOMEN PELVIS WITHOUT CONTRAST    CLINICAL HISTORY:  Abdominal pain, acute, nonlocalized;    TECHNIQUE:  Low dose axial images, sagittal and coronal reformations were obtained from the lung bases to the pubic symphysis, Oral contrast was not administered.    COMPARISON:  None    FINDINGS:  Heart: Normal in size. No pericardial effusion.    Small hiatal hernia.    Lung Bases: Few possible small subcentimeter nodular foci at the lung bases on axial 2, 3 and 10.    For multiple solid nodules all <6 mm, Fleischner Society 2017 guidelines recommend no routine follow up for a low risk  patient, or follow up with non-contrast chest CT at 12 months after discovery in a high risk patient.    Liver: There is a low-density mass in the right hepatic lobe abutting the gallbladder measuring approximately 7.4 x 6.2 cm on axial 22 and coronal 14.  Study significantly limited by lack of IV contrast.  Margins are not well-defined.  The mass is suspicious for neoplasm possibly of the liver.  Invasion/involvement of the gallbladder is a possibility.  Follow-up is recommended.    Gallbladder: The gallbladder is mildly distended.  Anterior margins ill-defined with possible invasion related to adjacent hepatic mass.  Gallbladder mass or inflammatory/infectious process with extension to the liver is not completely excluded.    Recommend surgical consultation and follow-up.    No calcified stones are detected.  No wall thickening or pericholecystic fluid elsewhere.    Bile Ducts: No evidence of dilated ducts.    Pancreas: Pancreas is atrophic with fatty change.  No focal abnormality.    2.4 cm soft tissue mass in the mesentery near the gallbladder and near the pancreatic head on coronal 20 and axial 22 could represent adenopathy.  Follow-up recommended.    Spleen: Unremarkable.    Adrenals: Unremarkable.    Kidneys/ Ureters: Unremarkable.    Bladder: Incomplete distention urinary bladder with diffuse wall thickening and adjacent inflammatory stranding suggesting cystitis.  Urinary bladder diverticulum projecting superiorly from the apex measuring approximately 2.4 cm.    Reproductive organs: Status post hysterectomy.    GI Tract/Mesentery: No evidence of bowel obstruction or inflammation.  There is diverticulosis of the colon.  No evidence of acute diverticulitis.    Peritoneal Space: No ascites. No free air.    Retroperitoneum: No significant adenopathy.    Abdominal wall: Unremarkable.    The appendix is within normal limits.    Vasculature: No significant atherosclerosis or aneurysm.    Bones: No acute  "fracture.    Grade 1 spondylolisthesis of L3 on L4.  Disc space narrowing and vacuum disc phenomena from L3 through S1.    Severe central canal narrowing at L3-4 and L4-5.  Severe bilateral foraminal narrowing at L3-4, L4-5 and L5-S1 on the right.    The lack of IV contrast diminishes the sensitivity.                                       CT Head Without Contrast (Final result)  Result time 10/16/24 18:59:39      Final result by Dilip Beckwith MD (10/16/24 18:59:39)                   Impression:      No acute intracranial process.      Electronically signed by: Dilip Beckwith  Date:    10/16/2024  Time:    18:59               Narrative:    EXAMINATION:  CT HEAD WITHOUT CONTRAST    CLINICAL HISTORY:  Mental status change, unknown cause;    TECHNIQUE:  Low dose axial CT images obtained throughout the head without intravenous contrast. Sagittal and coronal reconstructions were performed.    COMPARISON:  07/07/2016    FINDINGS:  Intracranial compartment:    Ventricles and sulci are normal in size for age without evidence of hydrocephalus. No extra-axial blood or fluid collections.  Cavum septum pellucidum at the midline.    Mild involutional changes and chronic microvascular ischemic changes.  No parenchymal mass, hemorrhage, edema or major vascular distribution infarct.    Skull/extracranial contents (limited evaluation): No fracture. Mastoid air cells and paranasal sinuses are essentially clear.    No significant change.                                       Medications   potassium bicarbonate disintegrating tablet 40 mEq (40 mEq Oral Given 10/16/24 2009)   ciprofloxacin HCl tablet 500 mg (500 mg Oral Given 10/16/24 2022)     Medical Decision Making  PT IS AN 89 YR OLD BF WITH HX " UTI AND DEHYDRATION"  WITH TRANSPORT PER EMS FOR AMS AND ON EMS ARRIVAL PT DID FOLLOW COMMANDS  BUT UNABLE TO SPEAK DUE TO DEHYDRATION WITH DRY MM. PT HAS LOWER ABDOMINAL PAIN DESCRIBED AS " JUST HURTS", DYSURIA, URINARY FREQUENCY ONSET " LAST WEEK WITH UA OBTAINED FRIDAY (4 D AGO)  PT HAD URINE TEST ON 10/11 WITH > 100,000 ENTEROBACTER AND >25,000 ENTEROCOCCUS, PT IS NOT ON ABX, PT LAST URINE CULTURES WERE NORMAL DYLAN 8/24 AND 3/24  GLU  PER EMS    PT HAS HAD DECREASED PO INTAKE FOR SEVERAL DAYS  PT DENIES FEVER, CHILLS, N/V/D, PALPITATIONS OR SYNCOPE    PT IS USUALLY UP IN WC  IN THE HOME. AND RESIDES ALONE AND HAS PERSONAL CARE AID 6 HOURS PER DAY AND A SON CHECKS ON PT  AND IS ATTENTIVE.  PT HAS CKD.        Amount and/or Complexity of Data Reviewed  Labs: ordered.     Details: Viewed and Other Results - Labs      Updated   Order   10/16/24 1721  Blood culture (site 2)  Collected: 10/16/24 1721  In process  Specimen: Blood         10/16/24 1731  Lactic Acid, Plasma  Collected: 10/16/24 1706  Final result  Specimen: Blood      Lactic Acid 2.0 mmol/L       10/16/24 1722  Comprehensive metabolic panel  Collected: 10/16/24 1706  Final result  Specimen: Blood      Sodium 144 mmol/L  Potassium 3.0 Low  mmol/L  Chloride 107 mmol/L  CO2 23 mmol/L  Anion Gap 17 High  mmol/L  Glucose 149 High  mg/dL  BUN 29 High  mg/dL  Creatinine 1.74 High  mg/dL  BUN/Creatinine Ratio 17  Calcium 8.1 Low  mg/dL  Total Protein 6.2 Low  g/dL  Albumin 2.3 Low  g/dL  Globulin 3.9 g/dL  A/G Ratio 0.6  Bilirubin, Total 0.5 mg/dL  Alk Phos 160 High  U/L  ALT 11 Low  U/L  AST 20 U/L  eGFR 28 Low  mL/min/1.73m2       10/16/24 1715  CBC Auto Differential  Collected: 10/16/24 1706  Final result  Specimen: Blood         10/16/24 1715  CBC with Differential  Collected: 10/16/24 1706  Final result  Specimen: Blood      WBC 9.31 K/uL  RBC 2.95 Low  M/uL  Hemoglobin 9.5 Low  g/dL  Hematocrit 29.8 Low  %  .0 High  fL  MCH 32.2 High  pg  MCHC 31.9 Low  g/dL  RDW 15.3 High  %  Platelet Count 259 K/uL  MPV 9.9 fL  Neutrophils % 74.5 High  %  Lymphocytes % 11.5 Low  %  Neutrophils, Abs 6.94 K/uL  Lymphocytes, Absolute 1.07 K/uL  Diff Type Auto  Monocytes % 8.2 High   %  Eosinophils % 5.7 High  %  Basophils % 0.1 %  Monocytes, Absolute 0.76 K/uL  Eosinophils, Absolute 0.53 High  K/uL  Basophils, Absolute 0.01 K/uL            Radiology: ordered.  Discussion of management or test interpretation with external provider(s): EXAM  LABS AS ABOVE K 3.0, WBC 9, H/H 9/29, BUN/CREAT 29/1.74 WITH GFR 28  (PRIOR 25-32 IN THE PAST YR)  CT HEAD , ABD AND CXR PENDING   ML STARTED PER EMS AND COMPLETED, PT HAD URINE OUTPUT  REPORT TO ASHU BATEMAN AT 1800               ED Course as of 10/16/24 2156   Wed Oct 16, 2024   1930 CT head no acute intracranial process; CT abdomen and pelvis-  Urinary bladder wall thickening with inflammatory changes suggesting cystitis.  Follow-up recommended. Urinary bladder diverticulum projecting superiorly from the apex also.  Ill-defined low-density mass in the right hepatic lobe of the liver abutting the gallbladder measuring approximately 7.4 x 6.4 cm.  The mass is suspicious for neoplasm.  Invasion/involvement of the gallbladder is a possibility. Recommend surgical consultation and follow-up. 2.4 cm soft tissue mass in the mesentery near the gallbladder and pancreatic head could represent adenopathy and metastatic disease.  Follow-up recommended. Few nonspecific small subcentimeter bibasilar pulmonary nodules. Small hiatal hernia.   [KT]   1951 Spoke with Dr. Blakely for General surgery on-call [facility] concerning patient's CT abdomen pelvis results.  He states he wants to see the patient at 2:00 p.m. tomorrow in his clinic for further care including possibly a liver biopsy and states he will take it from there when informed of also having small bibasilar pulmonary nodules.  Discussed test results and POC including medicine that is being prescribed for UTI, potassium for hypokalemia, and the follow up appointment with Dr. Blakely with patient and her son Boaz Gonzalez.  They verbalized understanding and agree with plan of care. [KT]   2020  Spoke with Shawn pharmacist at [facility] concerning patient needing Cipro for UTI and allergic to penicillin with elevated BUN/creatinine.  He states okay to give 500 mg once now since 250 mg p.o. not available in the ED/hospital. [KT]      ED Course User Index  [KT] Reny Fisher NP                           Clinical Impression:   Final diagnoses:  [N30.00] Acute cystitis without hematuria (Primary)  [E86.0] Dehydration  [E87.6] Hypokalemia  [R16.0] Liver mass, right lobe  [K44.9] Hiatal hernia  [R91.8] Pulmonary nodules        ED Disposition Condition    Discharge Stable          ED Prescriptions       Medication Sig Dispense Start Date End Date Auth. Provider    ciprofloxacin HCl (CIPRO) 250 MG tablet Take 1 tablet (250 mg total) by mouth 2 (two) times daily. for 7 days 14 tablet 10/16/2024 10/23/2024 Reny Fisher NP    potassium chloride SA (K-DUR,KLOR-CON) 20 MEQ tablet Take 1 tablet (20 mEq total) by mouth once daily. for 3 days 3 tablet 10/16/2024 10/19/2024 Reny Fisher NP          Follow-up Information    None          Brandie Santana MD  10/16/24 6849

## 2024-10-16 NOTE — ED NOTES
Verbal order from dr zamarripa to open fluids up wide open and let the rest run in. Patient has now received 500 cc ns

## 2024-10-16 NOTE — DISCHARGE INSTRUCTIONS
You have an appointment set with Dr. Blakely for General surgery at [facility] in [place] tomorrow at 2:00 p.m. due to the liver mass that was found on your CT today.    Follow up with primary care provider in the next 2-3 days for evaluation.  Increase water intake.   Complete full course of antibiotics and the potassium that is prescribed to you in the ED.    Return to emergency department for any new or worsening symptoms.

## 2024-10-17 ENCOUNTER — OFFICE VISIT (OUTPATIENT)
Dept: SURGERY | Facility: CLINIC | Age: 89
End: 2024-10-17
Attending: SURGERY
Payer: MEDICARE

## 2024-10-17 VITALS — WEIGHT: 160.06 LBS | HEIGHT: 64 IN | BODY MASS INDEX: 27.33 KG/M2

## 2024-10-17 DIAGNOSIS — C22.7 OTHER SPECIFIED CARCINOMAS OF LIVER: ICD-10-CM

## 2024-10-17 DIAGNOSIS — Z01.818 PRE-PROCEDURAL EXAMINATION: Primary | ICD-10-CM

## 2024-10-17 DIAGNOSIS — R94.5 ABNORMAL RESULTS OF LIVER FUNCTION STUDIES: ICD-10-CM

## 2024-10-17 PROCEDURE — 99999 PR PBB SHADOW E&M-EST. PATIENT-LVL IV: CPT | Mod: PBBFAC,,, | Performed by: SURGERY

## 2024-10-17 PROCEDURE — 1159F MED LIST DOCD IN RCRD: CPT | Mod: CPTII,,, | Performed by: SURGERY

## 2024-10-17 PROCEDURE — 85610 PROTHROMBIN TIME: CPT | Performed by: SURGERY

## 2024-10-17 PROCEDURE — 99203 OFFICE O/P NEW LOW 30 MIN: CPT | Mod: S$PBB,,, | Performed by: SURGERY

## 2024-10-17 PROCEDURE — 99214 OFFICE O/P EST MOD 30 MIN: CPT | Mod: PBBFAC | Performed by: SURGERY

## 2024-10-17 PROCEDURE — 85610 PROTHROMBIN TIME: CPT | Mod: 59 | Performed by: SURGERY

## 2024-10-17 NOTE — PROGRESS NOTES
General Surgery History and Physical      Patient ID: Aviva Gonzalez is a 89 y.o. female.    Chief Complaint: Pre-op Exam (Liver mass )      HPI:  89-year-old female referred from the ER for possible gallbladder mass with large liver mass and mesenteric lymphadenopathy.  She had about a week's worth of right-sided abdominal pain she was actually being treated for UTI and a CT scan for further workup revealed the masses.  History of breast cancer in the remote past.  History of tobacco chewing.  No weight loss recently    Review of Systems   Constitutional:  Negative for activity change, appetite change, fatigue and fever.   HENT:  Negative for trouble swallowing.    Respiratory:  Negative for cough and shortness of breath.    Cardiovascular:  Negative for chest pain and palpitations.   Gastrointestinal:  Positive for abdominal pain. Negative for abdominal distention, blood in stool, constipation and diarrhea.   Genitourinary:  Negative for flank pain.   Musculoskeletal:  Negative for neck pain and neck stiffness.   Neurological:  Negative for weakness.       Current Outpatient Medications   Medication Sig Dispense Refill    ADVAIR DISKUS 250-50 mcg/dose diskus inhaler Inhale 1 puff into the lungs 2 (two) times daily.      cetirizine (ZYRTEC) 10 MG tablet Take 1 tablet (10 mg total) by mouth once daily. 30 tablet 0    cholecalciferol, vitamin D3, (VITAMIN D3) 10 mcg (400 unit) Tab Take 1 tablet (400 Units total) by mouth once daily. 30 tablet 0    ciprofloxacin HCl (CIPRO) 250 MG tablet Take 1 tablet (250 mg total) by mouth 2 (two) times daily. for 7 days 14 tablet 0    diclofenac sodium (VOLTAREN) 1 % Gel Apply topically.      docusate sodium (COLACE) 100 MG capsule Take 100 mg by mouth 2 (two) times daily as needed for Constipation.      ferrous sulfate 325 (65 FE) MG EC tablet Take 325 mg by mouth 2 (two) times daily.       fluconazole (DIFLUCAN) 150 MG Tab Take one tablet by mouth every 72 hours X 3 doses if needed for yeast infection after completion of antibiotics. 3 tablet 0    fluticasone propionate (FLONASE) 50 mcg/actuation nasal spray by Each Nostril route.      furosemide (LASIX) 20 MG tablet Take 20 mg by mouth 2 (two) times a day.      lovastatin (MEVACOR) 20 MG tablet Take 1 tablet (20 mg total) by mouth every evening. 30 tablet 0    metoprolol succinate (TOPROL-XL) 25 MG 24 hr tablet Take 1 tablet (25 mg total) by mouth once daily. 30 tablet 0    mupirocin (BACTROBAN) 2 % ointment Apply topically 3 (three) times daily. 15 g 0    olopatadine (PATADAY) 0.2 % Drop 1 drop into both eyes Ophthalmic Once a day for allergies for 30 days      oxybutynin (DITROPAN) 5 MG Tab Take 5 mg by mouth 2 (two) times a day.      pantoprazole (PROTONIX) 40 MG tablet 1 tablet Orally Twice a day for 120 days      potassium chloride SA (K-DUR,KLOR-CON) 20 MEQ tablet Take 1 tablet (20 mEq total) by mouth once daily. for 3 days 3 tablet 0    tetracycline (ACHROMYCIN,SUMYCIN) 500 MG capsule 1 capsule on an empty stomach Orally every 6 hrs       No current facility-administered medications for this visit.       Review of patient's allergies indicates:   Allergen Reactions    Penicillins Rash       Past Medical History:   Diagnosis Date    Cancer     breast    Constipation     Hypercholesteremia     Hypertension     Hypokalemia     Renal disorder     Rheumatoid arthritis, unspecified     Seasonal allergies     Urinary frequency     Vitamin D deficiency        Past Surgical History:   Procedure Laterality Date    BREAST SURGERY Left        Family History   Problem Relation Name Age of Onset    Hypertension Mother      Diabetes Mother      Breast cancer Daughter         Social History     Socioeconomic History    Marital status:    Tobacco Use    Smoking status: Never     Passive exposure: Never    Smokeless tobacco: Current     Types: Chew    Substance and Sexual Activity    Alcohol use: Never    Drug use: Never    Sexual activity: Not Currently       There were no vitals filed for this visit.    Physical Exam  Constitutional:       General: She is not in acute distress.  HENT:      Head: Normocephalic.   Cardiovascular:      Rate and Rhythm: Normal rate and regular rhythm.      Pulses: Normal pulses.   Pulmonary:      Effort: Pulmonary effort is normal. No respiratory distress.      Breath sounds: Normal breath sounds.   Abdominal:      General: Abdomen is flat. There is no distension.      Palpations: Abdomen is soft.      Tenderness: There is abdominal tenderness (Mild soreness in the right side).   Musculoskeletal:         General: Normal range of motion.   Skin:     General: Skin is warm.   Neurological:      General: No focal deficit present.      Mental Status: She is oriented to person, place, and time.       Narrative & Impression  EXAMINATION:  CT ABDOMEN PELVIS WITHOUT CONTRAST     CLINICAL HISTORY:  Abdominal pain, acute, nonlocalized;     TECHNIQUE:  Low dose axial images, sagittal and coronal reformations were obtained from the lung bases to the pubic symphysis, Oral contrast was not administered.     COMPARISON:  None     FINDINGS:  Heart: Normal in size. No pericardial effusion.     Small hiatal hernia.     Lung Bases: Few possible small subcentimeter nodular foci at the lung bases on axial 2, 3 and 10.     For multiple solid nodules all <6 mm, Fleischner Society 2017 guidelines recommend no routine follow up for a low risk patient, or follow up with non-contrast chest CT at 12 months after discovery in a high risk patient.     Liver: There is a low-density mass in the right hepatic lobe abutting the gallbladder measuring approximately 7.4 x 6.2 cm on axial 22 and coronal 14.  Study significantly limited by lack of IV contrast.  Margins are not well-defined.  The mass is suspicious for neoplasm possibly of the liver.   Invasion/involvement of the gallbladder is a possibility.  Follow-up is recommended.     Gallbladder: The gallbladder is mildly distended.  Anterior margins ill-defined with possible invasion related to adjacent hepatic mass.  Gallbladder mass or inflammatory/infectious process with extension to the liver is not completely excluded.     Recommend surgical consultation and follow-up.     No calcified stones are detected.  No wall thickening or pericholecystic fluid elsewhere.     Bile Ducts: No evidence of dilated ducts.     Pancreas: Pancreas is atrophic with fatty change.  No focal abnormality.     2.4 cm soft tissue mass in the mesentery near the gallbladder and near the pancreatic head on coronal 20 and axial 22 could represent adenopathy.  Follow-up recommended.     Spleen: Unremarkable.     Adrenals: Unremarkable.     Kidneys/ Ureters: Unremarkable.     Bladder: Incomplete distention urinary bladder with diffuse wall thickening and adjacent inflammatory stranding suggesting cystitis.  Urinary bladder diverticulum projecting superiorly from the apex measuring approximately 2.4 cm.     Reproductive organs: Status post hysterectomy.     GI Tract/Mesentery: No evidence of bowel obstruction or inflammation.  There is diverticulosis of the colon.  No evidence of acute diverticulitis.     Peritoneal Space: No ascites. No free air.     Retroperitoneum: No significant adenopathy.     Abdominal wall: Unremarkable.     The appendix is within normal limits.     Vasculature: No significant atherosclerosis or aneurysm.     Bones: No acute fracture.     Grade 1 spondylolisthesis of L3 on L4.  Disc space narrowing and vacuum disc phenomena from L3 through S1.     Severe central canal narrowing at L3-4 and L4-5.  Severe bilateral foraminal narrowing at L3-4, L4-5 and L5-S1 on the right.     The lack of IV contrast diminishes the sensitivity.     Impression:     1. Urinary bladder wall thickening with inflammatory changes  suggesting cystitis.  Follow-up recommended.  2. Urinary bladder diverticulum projecting superiorly from the apex also.  3. Ill-defined low-density mass in the right hepatic lobe of the liver abutting the gallbladder measuring approximately 7.4 x 6.4 cm.  The mass is suspicious for neoplasm.  Invasion/involvement of the gallbladder is a possibility.  See above comments also.  Recommend surgical consultation and follow-up.  4. 2.4 cm soft tissue mass in the mesentery near the gallbladder and pancreatic head could represent adenopathy and metastatic disease.  Follow-up recommended.  5. Few nonspecific small subcentimeter bibasilar pulmonary nodules.  6. Small hiatal hernia.  7. This report was flagged in Epic as abnormal.        Electronically signed by:Dilip Oropeza  Date:                                            10/16/2024  Time:                                           19:22        Exam Ended: 10/16/24 18:26 CDT       Assessment & Plan:    Pre-procedural examination  -     PT and PTT; Future; Expected date: 10/17/2024  -     Protime-INR; Future; Expected date: 10/17/2024    Abnormal results of liver function studies  -     PT and PTT; Future; Expected date: 10/17/2024  -     Protime-INR; Future; Expected date: 10/17/2024    Other specified carcinomas of liver  -     PT and PTT; Future; Expected date: 10/17/2024        Will set patient up for a CT-guided biopsy for next week.  She will come back and see me afterwards talk about the results.  All questions were answered.

## 2024-10-18 ENCOUNTER — TELEPHONE (OUTPATIENT)
Dept: EMERGENCY MEDICINE | Facility: HOSPITAL | Age: 89
End: 2024-10-18
Payer: MEDICARE

## 2024-10-19 LAB
UA COMPLETE W REFLEX CULTURE PNL UR: ABNORMAL
UA COMPLETE W REFLEX CULTURE PNL UR: ABNORMAL

## 2024-10-23 LAB
BACTERIA BLD CULT: NORMAL
BACTERIA BLD CULT: NORMAL

## 2024-10-24 ENCOUNTER — HOSPITAL ENCOUNTER (OUTPATIENT)
Dept: RADIOLOGY | Facility: HOSPITAL | Age: 89
Discharge: HOME | End: 2024-10-24
Attending: SURGERY
Payer: MEDICARE

## 2024-10-24 VITALS
TEMPERATURE: 98 F | HEART RATE: 62 BPM | BODY MASS INDEX: 24.25 KG/M2 | SYSTOLIC BLOOD PRESSURE: 96 MMHG | HEIGHT: 68 IN | WEIGHT: 160 LBS | OXYGEN SATURATION: 100 % | DIASTOLIC BLOOD PRESSURE: 54 MMHG | RESPIRATION RATE: 16 BRPM

## 2024-10-24 DIAGNOSIS — C22.7 OTHER SPECIFIED CARCINOMAS OF LIVER: ICD-10-CM

## 2024-10-24 DIAGNOSIS — R94.5 ABNORMAL RESULTS OF LIVER FUNCTION STUDIES: ICD-10-CM

## 2024-10-24 PROCEDURE — 27200940 CT BIOPSY LIVER (XPD)

## 2024-10-24 PROCEDURE — 88341 IMHCHEM/IMCYTCHM EA ADD ANTB: CPT | Mod: TC,91,SUR | Performed by: RADIOLOGY

## 2024-10-24 PROCEDURE — 77012 CT SCAN FOR NEEDLE BIOPSY: CPT | Mod: TC

## 2024-10-24 PROCEDURE — 47000 NEEDLE BIOPSY OF LIVER PERQ: CPT

## 2024-10-24 PROCEDURE — 25000003 PHARM REV CODE 250: Performed by: RADIOLOGY

## 2024-10-24 RX ORDER — DIAZEPAM 5 MG/1
5 TABLET ORAL ONCE
Status: COMPLETED | OUTPATIENT
Start: 2024-10-24 | End: 2024-10-24

## 2024-10-24 RX ADMIN — DIAZEPAM 5 MG: 5 TABLET ORAL at 09:10

## 2024-10-24 NOTE — PROGRESS NOTES
Called interventional radiology. Notifed pt asymptomatic. Bandaid dry with no drainage and abdomen soft and denies pain.

## 2024-10-24 NOTE — PROCEDURES
Patient presents for CT guided liver biopsy. Procedure performed by Dr. Martinez with assistance from Bari MANCUSO. Informed consent has been obtained. Timeout called with everyone present in agreement. Patient prepped with chlor prep and draped in a sterile manner. 7 cc of 1% lidocaine infused. With a 18 gauge temno biopsy device 3 core specimens were obtained and sent to the lab for analysis. Specimen transported to the lab by Carmen Garcia RN. Needle withdrawn and pressure held on puncture site. Bandage then placed. Patient tolerated procedure well with no immediate complications.

## 2024-10-24 NOTE — PLAN OF CARE
To op room 11 via stretcher from interventional radilology. Bandaid to right upper abdomen area clean dry and intact.

## 2024-10-25 LAB
DHEA SERPL-MCNC: NORMAL
ESTROGEN SERPL-MCNC: NORMAL PG/ML
INSULIN SERPL-ACNC: NORMAL U[IU]/ML
LAB AP CLINICAL INFORMATION: NORMAL
LAB AP GROSS DESCRIPTION: NORMAL
LAB AP LABORATORY NOTES: NORMAL
LAB AP PREDICTIVE MARKER TESTING: NORMAL
T3RU NFR SERPL: NORMAL %

## 2024-11-07 ENCOUNTER — TELEPHONE (OUTPATIENT)
Dept: SURGERY | Facility: CLINIC | Age: 89
End: 2024-11-07
Payer: MEDICARE

## 2024-11-07 NOTE — TELEPHONE ENCOUNTER
----- Message from Med Assistant Collazo sent at 11/7/2024 12:23 PM CST -----  Who Called: Diamond or Boaz son/daughter in law    Caller is requesting assistance/information from provider's office.        Preferred Method of Contact: Phone Call  Patient's Preferred Phone Number on File: [phone]   Best Call Back Number, if different:  Additional Information: results of biopsy

## 2024-11-11 ENCOUNTER — OFFICE VISIT (OUTPATIENT)
Dept: SURGERY | Facility: CLINIC | Age: 89
End: 2024-11-11
Attending: SURGERY
Payer: MEDICARE

## 2024-11-11 DIAGNOSIS — R16.0 LIVER MASS, RIGHT LOBE: ICD-10-CM

## 2024-11-11 DIAGNOSIS — C79.9 METASTATIC MALIGNANT NEOPLASM, UNSPECIFIED SITE: Primary | ICD-10-CM

## 2024-11-11 PROCEDURE — 99999 PR PBB SHADOW E&M-EST. PATIENT-LVL III: CPT | Mod: PBBFAC,,, | Performed by: SURGERY

## 2024-11-11 PROCEDURE — 1159F MED LIST DOCD IN RCRD: CPT | Mod: CPTII,,, | Performed by: SURGERY

## 2024-11-11 PROCEDURE — 99213 OFFICE O/P EST LOW 20 MIN: CPT | Mod: S$PBB,,, | Performed by: SURGERY

## 2024-11-11 PROCEDURE — 99213 OFFICE O/P EST LOW 20 MIN: CPT | Mod: PBBFAC | Performed by: SURGERY

## 2024-11-11 NOTE — PROGRESS NOTES
General Surgery Progress Note    Subjective:      Patient ID: Aviva Gonzalez is a 89 y.o. female.    Chief Complaint: Follow-up      HPI:  89-year-old female follow up from CT-guided biopsy of liver lesion.  Positive for adenocarcinoma suspicious for biliary tree origin versus gallbladder.  Still having the right upper quadrant pain.    Past Medical History:   Diagnosis Date    Cancer     breast    Constipation     Hypercholesteremia     Hypertension     Hypokalemia     Renal disorder     Rheumatoid arthritis, unspecified     Seasonal allergies     Urinary frequency     Vitamin D deficiency      Past Surgical History:   Procedure Laterality Date    BREAST SURGERY Left      Social History     Socioeconomic History    Marital status:    Tobacco Use    Smoking status: Never     Passive exposure: Never    Smokeless tobacco: Current     Types: Chew   Substance and Sexual Activity    Alcohol use: Never    Drug use: Never    Sexual activity: Not Currently         Current Outpatient Medications:     ADVAIR DISKUS 250-50 mcg/dose diskus inhaler, Inhale 1 puff into the lungs 2 (two) times daily., Disp: , Rfl:     cetirizine (ZYRTEC) 10 MG tablet, Take 1 tablet (10 mg total) by mouth once daily., Disp: 30 tablet, Rfl: 0    cholecalciferol, vitamin D3, (VITAMIN D3) 10 mcg (400 unit) Tab, Take 1 tablet (400 Units total) by mouth once daily., Disp: 30 tablet, Rfl: 0    diclofenac sodium (VOLTAREN) 1 % Gel, Apply topically., Disp: , Rfl:     docusate sodium (COLACE) 100 MG capsule, Take 100 mg by mouth 2 (two) times daily as needed for Constipation., Disp: , Rfl:     ferrous sulfate 325 (65 FE) MG EC tablet, Take 325 mg by mouth 2 (two) times daily., Disp: , Rfl:     fluconazole (DIFLUCAN) 150 MG Tab, Take one tablet by mouth every 72 hours X 3 doses if needed for yeast infection after completion of antibiotics., Disp: 3 tablet, Rfl: 0    fluticasone propionate  (FLONASE) 50 mcg/actuation nasal spray, by Each Nostril route., Disp: , Rfl:     furosemide (LASIX) 20 MG tablet, Take 20 mg by mouth 2 (two) times a day., Disp: , Rfl:     lovastatin (MEVACOR) 20 MG tablet, Take 1 tablet (20 mg total) by mouth every evening., Disp: 30 tablet, Rfl: 0    metoprolol succinate (TOPROL-XL) 25 MG 24 hr tablet, Take 1 tablet (25 mg total) by mouth once daily., Disp: 30 tablet, Rfl: 0    mupirocin (BACTROBAN) 2 % ointment, Apply topically 3 (three) times daily., Disp: 15 g, Rfl: 0    olopatadine (PATADAY) 0.2 % Drop, 1 drop into both eyes Ophthalmic Once a day for allergies for 30 days, Disp: , Rfl:     oxybutynin (DITROPAN) 5 MG Tab, Take 5 mg by mouth 2 (two) times a day., Disp: , Rfl:     pantoprazole (PROTONIX) 40 MG tablet, 1 tablet Orally Twice a day for 120 days, Disp: , Rfl:     tetracycline (ACHROMYCIN,SUMYCIN) 500 MG capsule, 1 capsule on an empty stomach Orally every 6 hrs, Disp: , Rfl:   Review of patient's allergies indicates:   Allergen Reactions    Penicillins Rash       There were no vitals taken for this visit.    Review of Systems   Constitutional: Negative for chills, fever, weight gain and weight loss.   Eyes:  Negative for blurred vision.   Cardiovascular:  Negative for chest pain, claudication and palpitations.   Respiratory:  Negative for cough and shortness of breath.    Musculoskeletal:  Negative for muscle weakness.   Gastrointestinal:  Positive for abdominal pain. Negative for diarrhea, nausea and vomiting.   Neurological:  Negative for numbness and paresthesias.         Objective:     Constitutional: Well, No apparent distress  Mental Status: Alert and oriented  x 3  Eyes: Normal sclera, normal eyelid  Neck: Trachea midline, no masses on neck exam  Respiratory: Clear to auscultation bilaterally with no wheezes/crackles/cough  Cardiac: Regular rate and rhythm, no murmur, rub, or gallops  Abdominal: Soft, non-tender, non-distented  Hernia: No appreciated  hernias  Musculoskeletal: 5/5 strength no weakess no decreased range of montion  Neurologic: Cranial nerves II - XII intact    Labs/ Imaging: CBC:   Lab Results   Component Value Date/Time    WBC 9.31 10/16/2024 05:06 PM    RBC 2.95 (L) 10/16/2024 05:06 PM    HGB 9.5 (L) 10/16/2024 05:06 PM    HCT 29.8 (L) 10/16/2024 05:06 PM     10/16/2024 05:06 PM    .0 (H) 10/16/2024 05:06 PM    MCH 32.2 (H) 10/16/2024 05:06 PM    MCHC 31.9 (L) 10/16/2024 05:06 PM     BMP:   Lab Results   Component Value Date/Time     (H) 10/16/2024 05:06 PM    CO2 23 10/16/2024 05:06 PM    BUN 29 (H) 10/16/2024 05:06 PM    CREATININE 1.74 (H) 10/16/2024 05:06 PM    CALCIUM 8.1 (L) 10/16/2024 05:06 PM           Assessment:             1. Metastatic malignant neoplasm, unspecified site    2. Liver mass, right lobe          Plan:       Orders Placed This Encounter    Ambulatory referral/consult to Hematology / Oncology     No follow-ups on file.        Patient will be referred to Dr. Squires for possible oncologic guidance.  Not a surgical candidate due to the stage IV disease.  Explained to her the stage and that she has been not even be a candidate for chemotherapy.

## 2024-11-19 ENCOUNTER — HOSPITAL ENCOUNTER (EMERGENCY)
Facility: HOSPITAL | Age: 89
Discharge: HOME | End: 2024-11-19
Attending: EMERGENCY MEDICINE
Payer: MEDICARE

## 2024-11-19 VITALS
WEIGHT: 130 LBS | BODY MASS INDEX: 19.7 KG/M2 | RESPIRATION RATE: 20 BRPM | DIASTOLIC BLOOD PRESSURE: 85 MMHG | OXYGEN SATURATION: 98 % | HEIGHT: 68 IN | SYSTOLIC BLOOD PRESSURE: 125 MMHG | TEMPERATURE: 98 F | HEART RATE: 82 BPM

## 2024-11-19 DIAGNOSIS — M62.81 MUSCLE WEAKNESS: ICD-10-CM

## 2024-11-19 DIAGNOSIS — R16.0 LIVER MASS, RIGHT LOBE: Primary | ICD-10-CM

## 2024-11-19 DIAGNOSIS — R10.11 RIGHT UPPER QUADRANT ABDOMINAL PAIN: ICD-10-CM

## 2024-11-19 DIAGNOSIS — E86.0 DEHYDRATION, MILD: ICD-10-CM

## 2024-11-19 DIAGNOSIS — C22.0 LIVER CARCINOMA: Primary | ICD-10-CM

## 2024-11-19 LAB
ALBUMIN SERPL BCP-MCNC: 2.7 G/DL (ref 3.4–4.8)
ALBUMIN/GLOB SERPL: 0.7 {RATIO}
ALP SERPL-CCNC: 932 U/L (ref 40–150)
ALT SERPL W P-5'-P-CCNC: 171 U/L (ref 0–55)
ANION GAP SERPL CALCULATED.3IONS-SCNC: 18 MMOL/L (ref 7–16)
ANISOCYTOSIS BLD QL SMEAR: ABNORMAL
AST SERPL W P-5'-P-CCNC: 584 U/L (ref 5–34)
BASOPHILS # BLD AUTO: 0 K/UL (ref 0–0.2)
BASOPHILS NFR BLD AUTO: 0 % (ref 0–1)
BILIRUB SERPL-MCNC: 2.2 MG/DL
BUN SERPL-MCNC: 27 MG/DL (ref 10–20)
BUN/CREAT SERPL: 16 (ref 6–20)
CALCIUM SERPL-MCNC: 8.9 MG/DL (ref 8.4–10.2)
CHLORIDE SERPL-SCNC: 107 MMOL/L (ref 98–107)
CO2 SERPL-SCNC: 21 MMOL/L (ref 23–31)
CREAT SERPL-MCNC: 1.73 MG/DL (ref 0.55–1.02)
DIFFERENTIAL METHOD BLD: ABNORMAL
EGFR (NO RACE VARIABLE) (RUSH/TITUS): 28 ML/MIN/1.73M2
EOSINOPHIL # BLD AUTO: 0.47 K/UL (ref 0–0.5)
EOSINOPHIL NFR BLD AUTO: 5.2 % (ref 1–4)
EOSINOPHIL NFR BLD MANUAL: 5 % (ref 1–4)
ERYTHROCYTE [DISTWIDTH] IN BLOOD BY AUTOMATED COUNT: 15.4 % (ref 11.5–14.5)
GLOBULIN SER-MCNC: 3.7 G/DL (ref 2–4)
GLUCOSE SERPL-MCNC: 111 MG/DL (ref 82–115)
GROUP A STREP MOLECULAR (OHS): NEGATIVE
HCT VFR BLD AUTO: 29.3 % (ref 38–47)
HGB BLD-MCNC: 9.4 G/DL (ref 12–16)
HYPOCHROMIA BLD QL SMEAR: ABNORMAL
INFLUENZA A MOLECULAR (OHS): NEGATIVE
INFLUENZA B MOLECULAR (OHS): NEGATIVE
LYMPHOCYTES # BLD AUTO: 0.74 K/UL (ref 1–4.8)
LYMPHOCYTES NFR BLD AUTO: 8.2 % (ref 27–41)
LYMPHOCYTES NFR BLD MANUAL: 10 % (ref 27–41)
MCH RBC QN AUTO: 31.8 PG (ref 27–31)
MCHC RBC AUTO-ENTMCNC: 32.1 G/DL (ref 32–36)
MCV RBC AUTO: 99 FL (ref 80–96)
MICROCYTES BLD QL SMEAR: ABNORMAL
MONOCYTES # BLD AUTO: 0.59 K/UL (ref 0–0.8)
MONOCYTES NFR BLD AUTO: 6.6 % (ref 2–6)
MONOCYTES NFR BLD MANUAL: 6 % (ref 2–6)
MPC BLD CALC-MCNC: 10 FL (ref 9.4–12.4)
NEUTROPHILS # BLD AUTO: 7.2 K/UL (ref 1.8–7.7)
NEUTROPHILS NFR BLD AUTO: 80 % (ref 53–65)
NEUTS SEG NFR BLD MANUAL: 79 % (ref 50–62)
NRBC BLD MANUAL-RTO: ABNORMAL %
OVALOCYTES BLD QL SMEAR: ABNORMAL
PLATELET # BLD AUTO: 238 K/UL (ref 150–400)
PLATELET MORPHOLOGY: NORMAL
POTASSIUM SERPL-SCNC: 3.7 MMOL/L (ref 3.5–5.1)
PROT SERPL-MCNC: 6.4 G/DL (ref 5.8–7.6)
RBC # BLD AUTO: 2.96 M/UL (ref 4.2–5.4)
SARS-COV-2 RDRP RESP QL NAA+PROBE: NEGATIVE
SODIUM SERPL-SCNC: 142 MMOL/L (ref 136–145)
TARGETS BLD QL SMEAR: ABNORMAL
TROPONIN I SERPL HS-MCNC: 9.3 NG/L
WBC # BLD AUTO: 9 K/UL (ref 4.5–11)

## 2024-11-19 PROCEDURE — 36415 COLL VENOUS BLD VENIPUNCTURE: CPT | Performed by: EMERGENCY MEDICINE

## 2024-11-19 PROCEDURE — 87502 INFLUENZA DNA AMP PROBE: CPT | Performed by: EMERGENCY MEDICINE

## 2024-11-19 PROCEDURE — 87635 SARS-COV-2 COVID-19 AMP PRB: CPT | Performed by: EMERGENCY MEDICINE

## 2024-11-19 PROCEDURE — 84484 ASSAY OF TROPONIN QUANT: CPT | Performed by: EMERGENCY MEDICINE

## 2024-11-19 PROCEDURE — 96360 HYDRATION IV INFUSION INIT: CPT

## 2024-11-19 PROCEDURE — 99285 EMERGENCY DEPT VISIT HI MDM: CPT | Mod: ,,, | Performed by: EMERGENCY MEDICINE

## 2024-11-19 PROCEDURE — 85025 COMPLETE CBC W/AUTO DIFF WBC: CPT | Performed by: EMERGENCY MEDICINE

## 2024-11-19 PROCEDURE — 93005 ELECTROCARDIOGRAM TRACING: CPT

## 2024-11-19 PROCEDURE — 99285 EMERGENCY DEPT VISIT HI MDM: CPT | Mod: 25

## 2024-11-19 PROCEDURE — 80053 COMPREHEN METABOLIC PANEL: CPT | Performed by: EMERGENCY MEDICINE

## 2024-11-19 PROCEDURE — 25000003 PHARM REV CODE 250: Performed by: EMERGENCY MEDICINE

## 2024-11-19 PROCEDURE — 87651 STREP A DNA AMP PROBE: CPT | Performed by: EMERGENCY MEDICINE

## 2024-11-19 RX ORDER — HYDROCODONE BITARTRATE AND ACETAMINOPHEN 5; 325 MG/1; MG/1
1 TABLET ORAL EVERY 6 HOURS PRN
Qty: 20 TABLET | Refills: 0 | Status: SHIPPED | OUTPATIENT
Start: 2024-11-19 | End: 2024-11-24

## 2024-11-19 RX ORDER — ONDANSETRON 4 MG/1
4 TABLET, FILM COATED ORAL EVERY 8 HOURS PRN
Qty: 15 TABLET | Refills: 0 | Status: SHIPPED | OUTPATIENT
Start: 2024-11-19 | End: 2024-11-24

## 2024-11-19 RX ORDER — DOCUSATE SODIUM 100 MG
500 CAPSULE ORAL
Status: DISCONTINUED | OUTPATIENT
Start: 2024-11-19 | End: 2024-11-19 | Stop reason: HOSPADM

## 2024-11-19 RX ORDER — ONDANSETRON 4 MG/1
4 TABLET, ORALLY DISINTEGRATING ORAL
Status: COMPLETED | OUTPATIENT
Start: 2024-11-19 | End: 2024-11-19

## 2024-11-19 RX ORDER — HYDROCODONE BITARTRATE AND ACETAMINOPHEN 5; 325 MG/1; MG/1
1 TABLET ORAL
Status: COMPLETED | OUTPATIENT
Start: 2024-11-19 | End: 2024-11-19

## 2024-11-19 RX ADMIN — HYDROCODONE BITARTRATE AND ACETAMINOPHEN 1 TABLET: 5; 325 TABLET ORAL at 04:11

## 2024-11-19 RX ADMIN — ONDANSETRON 4 MG: 4 TABLET, ORALLY DISINTEGRATING ORAL at 04:11

## 2024-11-19 RX ADMIN — Medication 500 ML: at 04:11

## 2024-11-19 RX ADMIN — SODIUM CHLORIDE 250 ML: 0.9 INJECTION, SOLUTION INTRAVENOUS at 02:11

## 2024-11-19 NOTE — ED NOTES
Patient states she does not think she can void. Dr zamarripa called IP nurse to come do a bladder scan

## 2024-11-19 NOTE — ED PROVIDER NOTES
Encounter Date: 11/19/2024       History     Chief Complaint   Patient presents with    Generalized Body Aches     PT IS AN 89 YR OLD BF WITH ANOREXIA, MALAISE AND MYALGIAS, AND RUQ PAIN FOR 2 MONTHS. PT STATES LIMITED PO INTAKE THIS WEEK.  PT HAS BEEN TAKING OTC TYLENOL PRN WHICH HAS BEEN INEFFECTIVE.  PT DENIES FEVER/CHILLS, VOMITING OR DIARRHEA    PT HAS BEEN DX WITH LIVER CARCINOMA STAGE IV > 7 CM TUMOR INITIALLY NOTED ON CT ABD 10/16/2024 AND S/P BX 10/24/2024 WITH RESULTS ADVISED PER DR OTERO ON CLINIC VISIT 11/11/2024 WITH REFERRAL TO DR FREEMAN WITH REFERRAL APPARENTLY PENDING PER SON  Final Diagnosis 10/24/2024  A. Liver mass, needle core biopsy:  - Poorly-differentiated carcinoma with extensive necrosis        The history is provided by the patient.     Review of patient's allergies indicates:   Allergen Reactions    Penicillins Rash     Past Medical History:   Diagnosis Date    Cancer     breast    Constipation     Hypercholesteremia     Hypertension     Hypokalemia     Renal disorder     Rheumatoid arthritis, unspecified     Seasonal allergies     Urinary frequency     Vitamin D deficiency      Past Surgical History:   Procedure Laterality Date    BREAST SURGERY Left      Family History   Problem Relation Name Age of Onset    Hypertension Mother      Diabetes Mother      Breast cancer Daughter       Social History     Tobacco Use    Smoking status: Never     Passive exposure: Never    Smokeless tobacco: Current     Types: Chew   Substance Use Topics    Alcohol use: Never    Drug use: Never     Review of Systems   Unable to perform ROS: Age       Physical Exam     Initial Vitals   BP Pulse Resp Temp SpO2   11/19/24 1419 11/19/24 1417 11/19/24 1417 11/19/24 1417 11/19/24 1417   105/77 97 20 98.2 °F (36.8 °C) 98 %      MAP       --                Physical Exam    Nursing note and vitals reviewed.  Constitutional: She appears distressed.   FRAIL BF   HENT:   Head: Normocephalic.   Right Ear: External ear normal.    Left Ear: External ear normal.   Nose: Nose normal.   MM DRY   Eyes: EOM are normal. Pupils are equal, round, and reactive to light.   Neck: Neck supple.   Normal range of motion.  Cardiovascular:  Normal rate and regular rhythm.           Murmur (2/6 NATI) heard.  Pulmonary/Chest: Breath sounds normal. No respiratory distress. She has no wheezes. She has no rales.   Abdominal: Abdomen is soft. Bowel sounds are normal. She exhibits no distension. There is abdominal tenderness (RUQ).   Musculoskeletal:         General: No tenderness or edema. Normal range of motion.      Cervical back: Normal range of motion and neck supple.     Neurological: She is alert and oriented to person, place, and time. No cranial nerve deficit.   Skin: Skin is warm and dry. Capillary refill takes less than 2 seconds.   Psychiatric:   PT IS PLEASANT AND FOLLOWS COMMANDS         Medical Screening Exam   See Full Note    ED Course   Procedures  Labs Reviewed   COMPREHENSIVE METABOLIC PANEL - Abnormal       Result Value    Sodium 142      Potassium 3.7      Chloride 107      CO2 21 (*)     Anion Gap 18 (*)     Glucose 111      BUN 27 (*)     Creatinine 1.73 (*)     BUN/Creatinine Ratio 16      Calcium 8.9      Total Protein 6.4      Albumin 2.7 (*)     Globulin 3.7      A/G Ratio 0.7      Bilirubin, Total 2.2 (*)     Alk Phos 932 (*)      (*)      (*)     eGFR 28 (*)    CBC WITH DIFFERENTIAL - Abnormal    WBC 9.00      RBC 2.96 (*)     Hemoglobin 9.4 (*)     Hematocrit 29.3 (*)     MCV 99.0 (*)     MCH 31.8 (*)     MCHC 32.1      RDW 15.4 (*)     Platelet Count 238      MPV 10.0      Neutrophils % 80.0 (*)     Lymphocytes % 8.2 (*)     Neutrophils, Abs 7.20      Lymphocytes, Absolute 0.74 (*)     Diff Type Manual      Monocytes % 6.6 (*)     Eosinophils % 5.2 (*)     Basophils % 0.0      Monocytes, Absolute 0.59      Eosinophils, Absolute 0.47      Basophils, Absolute 0.00     MANUAL DIFFERENTIAL - Abnormal    Segmented  Neutrophils, Man % 79 (*)     Lymphocytes, Man % 10 (*)     Monocytes, Man % 6      Eosinophils, Man % 5 (*)     nRBC, Manual        Platelet Morphology Normal      Anisocytosis 2+      Microcytosis 1+      Target Cells Few      Ovalocytes Few      Hypochromic 1+     INFLUENZA A & B BY MOLECULAR - Normal    INFLUENZA A MOLECULAR Negative      INFLUENZA B MOLECULAR  Negative     TROPONIN I - Normal    Troponin I High Sensitivity 9.3     SARS-COV-2 RNA AMPLIFICATION, QUAL - Normal    SARS COV-2 Molecular Negative      Narrative:     Negative SARS-CoV results should not be used as the sole basis for treatment or patient management decisions; negative results should be considered in the context of a patient's recent exposures, history and the presene of clinical signs and symptoms consistent with COVID-19.  Negative results should be treated as presumptive and confirmed by molecular assay, if necessary for patient management.   STREP A BY MOLECULAR METHOD - Normal    Group A Strep Molecular Negative     CBC W/ AUTO DIFFERENTIAL    Narrative:     The following orders were created for panel order CBC Auto Differential.  Procedure                               Abnormality         Status                     ---------                               -----------         ------                     CBC with Differential[[id number]]       Abnormal            Final result               Manual Differential[[id number]]         Abnormal            Final result                 Please view results for these tests on the individual orders.   URINALYSIS     EKG Readings: (Independently Interpreted)   Initial Reading: No STEMI. Rhythm: Normal Sinus Rhythm. Heart Rate: 92. Ectopy: No Ectopy. Conduction: LAFB. Other Findings: Prolonged QT Interval.       Imaging Results              X-Ray Chest 1 View (Final result)  Result time 11/19/24 16:34:11      Final result by Sunny Ackerman MD (11/19/24 16:34:11)                   Impression:      No  acute intrathoracic process.      Electronically signed by: Sunny Ackerman MD  Date:    11/19/2024  Time:    16:34               Narrative:    EXAMINATION:  XR CHEST 1 VIEW    CLINICAL HISTORY:  MALAISE;    TECHNIQUE:  Single frontal view of the chest was performed.    COMPARISON:  10/16/2024.    FINDINGS:  Monitoring EKG leads are present.  There are postoperative changes in the left axillary region.    The trachea is unremarkable.  The cardiomediastinal silhouette is stable.  There is no evidence of free air beneath the hemidiaphragms.  There are no pleural effusions.  There is no evidence of a pneumothorax.  There is no evidence of pneumomediastinum.  No airspace opacity is present.  The aeration of the lung fields are unchanged.  There are degenerative changes in the osseous structures.                                    X-Rays:   Independently Interpreted Readings:   Chest X-Ray: Impression: No acute intrathoracic process. Electronically signed by: Sunny Ackerman MD Date: 11/19/2024 Time: 16:34         Medications   electrolytes-dextrose (Pedialyte) oral solution 500 mL (500 mLs Oral Given 11/19/24 1615)   sodium chloride 0.9% bolus 250 mL 250 mL (0 mLs Intravenous Stopped 11/19/24 1558)   HYDROcodone-acetaminophen 5-325 mg per tablet 1 tablet (1 tablet Oral Given 11/19/24 1611)   ondansetron disintegrating tablet 4 mg (4 mg Oral Given 11/19/24 1611)     Medical Decision Making  PT IS AN 89 YR OLD BF WITH ANOREXIA, MALAISE AND MYALGIAS, AND RUQ PAIN FOR 2 MONTHS. PT STATES LIMITED PO INTAKE THIS WEEK.  PT HAS BEEN TAKING OTC TYLENOL PRN WHICH HAS BEEN INEFFECTIVE.  PT DENIES FEVER/CHILLS, VOMITING OR DIARRHEA    PT HAS BEEN DX WITH LIVER CARCINOMA STAGE IV > 7 CM TUMOR INITIALLY NOTED ON CT ABD 10/16/2024 AND S/P BX 10/24/2024 WITH RESULTS ADVISED PER DR OTERO ON CLINIC VISIT 11/11/2024 WITH REFERRAL TO DR FREEMAN WITH REFERRAL APPARENTLY PENDING PER SON  Final Diagnosis 10/24/2024  A. Liver mass, needle core biopsy:  -  Poorly-differentiated carcinoma with extensive necrosis        Amount and/or Complexity of Data Reviewed  Independent Historian: caregiver  External Data Reviewed: radiology.  Labs: ordered.     Details: Viewed and Other Results - Labs    Updated   Order   11/19/24 1527  CBC Auto Differential  Collected: 11/19/24 1451  Final result  Specimen: Blood         11/19/24 1527  CBC with Differential  Collected: 11/19/24 1451  Final result  Specimen: Blood      WBC 9.00 K/uL  RBC 2.96 Low  M/uL  Hemoglobin 9.4 Low  g/dL  Hematocrit 29.3 Low  %  MCV 99.0 High  fL  MCH 31.8 High  pg  MCHC 32.1 g/dL  RDW 15.4 High  %  Platelet Count 238 K/uL  MPV 10.0 fL  Neutrophils % 80.0 High  %  Lymphocytes % 8.2 Low  %  Neutrophils, Abs 7.20 K/uL  Lymphocytes, Absolute 0.74 Low  K/uL  Diff Type Manual  Monocytes % 6.6 High  %  Eosinophils % 5.2 High  %  Basophils % 0.0 %  Monocytes, Absolute 0.59 K/uL  Eosinophils, Absolute 0.47 K/uL  Basophils, Absolute 0.00 K/uL       11/19/24 1527  Manual Differential  Collected: 11/19/24 1451  Final result  Specimen: Blood      Segmented Neutrophils, Man % 79 High  %  Lymphocytes, Man % 10 Low  %  Monocytes, Man % 6 %  Eosinophils, Man % 5 High  %  nRBC, Manual -  Platelet Morphology Normal  Anisocytosis 2+  Microcytosis 1+  Target Cells Few  Ovalocytes Few  Hypochromic 1+       11/19/24 1523  Influenza A & B by Molecular  Collected: 11/19/24 1451  Final result  Specimen: Nasopharyngeal Swab      INFLUENZA A MOLECULAR Negative  INFLUENZA B MOLECULAR Negative       11/19/24 1522  COVID-19 Rapid Screening  Collected: 11/19/24 1451  Final result  Specimen: Nasal Swab      SARS COV-2 Molecular Negative       11/19/24 1522  Strep A by Molecular Method  Collected: 11/19/24 1451  Final result  Specimen: Throat      Group A Strep Molecular Negative       11/19/24 1519  Troponin I  Collected: 11/19/24 1451  Final result  Specimen: Blood      Troponin I High Sensitivity 9.3 ng/L       11/19/24  "1519  Comprehensive metabolic panel  Collected: 11/19/24 1451  Final result  Specimen: Blood      Sodium 142 mmol/L  Potassium 3.7 mmol/L  Chloride 107 mmol/L  CO2 21 Low  mmol/L  Anion Gap 18 High  mmol/L  Glucose 111 mg/dL  BUN 27 High  mg/dL  Creatinine 1.73 High  mg/dL  BUN/Creatinine Ratio 16  Calcium 8.9 mg/dL  Total Protein 6.4 g/dL  Albumin 2.7 Low  g/dL  Globulin 3.7 g/dL  A/G Ratio 0.7  Bilirubin, Total 2.2 High  mg/dL  Alk Phos 932 High  U/L   High  U/L   High  U/L  eGFR 28 Low  mL/min/1.73m2        Radiology: ordered.     Details: CXR NO ACUTE CHANGE    Discussion of management or test interpretation with external provider(s): EXAM    ML BOLUS, PEDIALYTE, NORCO, ZOFRAN  LABS REVIEWED  CXR NEG  I SPOKE WITH ONCOLOGY OFFICE -DR FREEMAN  WITH [facility] AND SHE SAID THERE IS NO REFERRAL IN " THE BOOK"  AND THAT THEY DON'T ACCEPT PT'S INSURANCE  [facility] AT DR OTERO'S OFFICE STATES THEY WERE WORKING ON A REFERRAL PER DR NATALIE TEE AND THAT IF SON HAS NOT HEARD ANYTHING BY TOMORROW THEN CALL HER PER MARI FLOREZ  IMPROVED          Risk  OTC drugs.  Prescription drug management.                                      Clinical Impression:   Final diagnoses:  [M62.81] Muscle weakness  [E86.0] Dehydration, mild  [C22.0] Liver carcinoma - STAGE IV (Primary)  [R10.11] Right upper quadrant abdominal pain        ED Disposition Condition    Discharge Stable          ED Prescriptions       Medication Sig Dispense Start Date End Date Auth. Provider    HYDROcodone-acetaminophen (NORCO) 5-325 mg per tablet Take 1 tablet by mouth every 6 (six) hours as needed for Pain. 20 tablet 11/19/2024 11/24/2024 Brandie Santana MD    ondansetron (ZOFRAN) 4 MG tablet Take 1 tablet (4 mg total) by mouth every 8 (eight) hours as needed for Nausea. 15 tablet 11/19/2024 11/24/2024 Brandie Santana MD          Follow-up Information       Follow up With Specialties Details Why Contact Info    Becki Edwards NP Family " Medicine In 2 days CALL WITH CONDITION REPORT TOMORROW [address] [facility] [place] [address]  [phone]      Jericho Blakely MD General Surgery, Surgery Call in 1 day IF YOU HAVE NOT HEARD FROM THEM TOMORROW AFTERNOON [address] [place] [address] [place] [address]  [phone]               Brandie Santana MD  11/19/24 5839

## 2024-11-19 NOTE — ED NOTES
Spoke with Esha BLAKE with Dr Blakely's office and she was informed that we had called Dr Squires's office and they had not received a referral and that they said they did not take her insurance. And that dr zamarripa had requested for them to send a referral to another oncologist that might take her ins. Esha said she would take care of that and to let patient's son know to call her tomorrow afternoon if he had not heard from Dr Sears's office that she was sending them a referral. Son aware and v/u

## 2024-11-19 NOTE — DISCHARGE INSTRUCTIONS
INCREASE REST AND FLUIDS  BOOST OR PROTEIN SHAKES AS NEEDED  HONOR FOOD PREFERENCES AS PT DESIRES  MEDICATIONS AS DIRECTED   NORCO FOR MODERATE PAIN, ZOFRAN  OVER THE COUNTER   TYLENOL FOR MILD PAIN    IF YOU HAVE NOT HEARD FROM DR OTERO'S OFFICE-[facility] BY TOMORROW AFTERNOON CALL THEM

## 2024-11-20 ENCOUNTER — TELEPHONE (OUTPATIENT)
Dept: EMERGENCY MEDICINE | Facility: HOSPITAL | Age: 89
End: 2024-11-20
Payer: MEDICARE

## 2024-11-20 NOTE — TELEPHONE ENCOUNTER
Pt son states pt is better, pt is drinking but not eating, instructed pt son to encourage pt to drink boost, pt son vu